# Patient Record
Sex: FEMALE | Race: WHITE | Employment: UNEMPLOYED | URBAN - NONMETROPOLITAN AREA
[De-identification: names, ages, dates, MRNs, and addresses within clinical notes are randomized per-mention and may not be internally consistent; named-entity substitution may affect disease eponyms.]

---

## 2023-03-14 PROBLEM — M54.9 SPINAL COLUMN PAIN: Status: ACTIVE | Noted: 2023-03-14

## 2023-03-14 PROBLEM — T78.40XA ALLERGY: Status: ACTIVE | Noted: 2023-03-14

## 2023-03-14 PROBLEM — A69.20 LYME DISEASE, ACUTE: Status: ACTIVE | Noted: 2023-03-14

## 2023-03-14 PROBLEM — J34.2 NOSE SEPTUM DEVIATION: Status: ACTIVE | Noted: 2023-03-14

## 2023-03-14 PROBLEM — R09.81 CHRONIC NASAL CONGESTION: Status: ACTIVE | Noted: 2023-03-14

## 2023-03-14 PROBLEM — M47.817 SPONDYLOSIS OF LUMBOSACRAL REGION WITHOUT MYELOPATHY OR RADICULOPATHY: Status: ACTIVE | Noted: 2023-03-14

## 2023-03-14 PROBLEM — R09.82 POSTNASAL DISCHARGE: Status: ACTIVE | Noted: 2023-03-14

## 2023-03-14 PROBLEM — T63.481A LOCAL REACTION TO INSECT STING: Status: ACTIVE | Noted: 2023-03-14

## 2023-03-14 PROBLEM — E78.00 HYPERCHOLESTEREMIA: Status: ACTIVE | Noted: 2023-03-14

## 2023-03-14 PROBLEM — R53.83 FATIGUE: Status: ACTIVE | Noted: 2023-03-14

## 2023-11-07 ENCOUNTER — OFFICE VISIT (OUTPATIENT)
Dept: OTOLARYNGOLOGY | Facility: CLINIC | Age: 63
End: 2023-11-07
Payer: COMMERCIAL

## 2023-11-07 DIAGNOSIS — J34.9 NASAL DISORDER: Primary | ICD-10-CM

## 2023-11-07 PROCEDURE — 99213 OFFICE O/P EST LOW 20 MIN: CPT | Performed by: OTOLARYNGOLOGY

## 2023-11-07 NOTE — PROGRESS NOTES
Chief Complaint     follow up        History of Present Illness    11.07.2023: Feels dryness in her nose. She still feels some tightness in her nose.  Lateral parts of her nose tend to collapse with deep inspiration.  She does not want to go for Latera implant right now.  On examination nasal passages look wide open bilaterally.    Recommendations  1- saline and/or oil spray for the nose  2-follow-up in 6 months    _________________________________________________________________    05.09.2023: She comes for follow up. She is using a removable nasal implant to hold her nostrils laterally (Silent mammoth), it helps. She thinks her nostrils collapse if she does not use it.      Secondly, off and on she wakes up, choking her secretions. It may happen during the day, too.  Sleeps better at night, huge difference.      Her nostrils tend to collapse inside with forced inspiration.  Nasal passages look wide open bilaterally.     Recommendation:  1- lateral nasal implant  ____________________________________________________________________________________________     03.07.2023: She comes for follow up. She had nasal surgery (septoplasty, clarifix and RF) on 12.08.2022. Still breathes through her mouth when doing things. She thinks she cannot get enough air through her nose. Feels like her nostrils are collapsing, tries to keep them open. She pulls her cheeks laterally to get more air. She quit rinsing her nose 4 days ago. Felt saline was drying her nose. Still swallowing thick mucus, but it is getting better.      She has less headaches.  Sleep quality is better.      On examination, nasal passages look wide open bilaterally. There was mild crusting, cleaning was done. Mucus (+) at nasopharynx, no purulence. Vocal cords are mobile bilaterally. No mass or ulceration.      Recommendation:  1- try breathe right strips, if they help then we can consider lateral nasal implants.       ____________________________________________________________________________________________        02.10.2023: She comes for follow up. There was crusting scabbing over inferior turbinates bilaterally. Cleaning was done. Nasal passages look open. Feels like there is something at the back side of her upper throat (nasopharynx). I think it may be thick mucus.     Recommendation  Follow-up in 1 month  Continue nasal rinse and ointment     ____________________________________________________________________________________________     01.20.20223: She comes for follow up. On examination there was crusting bilaterally.      Recommendation  1â€“follow-up in 1 month, continue nasal irrigation and ointment.  ____________________________________________________________________________________________        12.13.2022: She had nasal surgery (septoplasty, clarifix and RF) on 12.08.2022.. She comes for follow up. On examination there was crusting bilaterally.     Recommendation  1â€“follow-up in 4 weeks     ____________________________________________________________________________________________     09.28.2022: She comes for follow up. She had her CT scan at an outside imaging center, which shows deviated nasal septum to right. She has difficulty with breathing, thick postnasal discharge. She could not find relief with nasal spray.  History of allergies to multiple antibiotics, NSAIDS, antihistamine tablets and to decongestant tablets. Please check allergy list.     Plan:  1- septoplasty, radiofrequency turbinate reduction, clarifix cryotherapy (she has thick postnasal mucoid discharge)   Surgical plan was explained to her. Questions were answered. She agrees with the plan.     ____________________________________________________________________________________________     Ms. Garcia is a 60 yo F. She feels like she cannot get enough air.   She almost always breathes through her mouth, for years. Blowing her nose does not  get rid of the swelling in her nose. She has used various antibiotics so far, but could not find relief.   Thick postnasal discharge (+). When lying down on her right side, she feels like a glob is moving toward her left side. It is vice versa for the right.      She feels fullness heaviness in her upper palate and throat.  She irrigates her nose with saline/soda solution once or twice a day off and on, for years.      There are spots in her tonsils.   She had covid couple of months ago.     She has tried a nasal spray (fluticasone?) without a benefit.     On examination, nasal septum deviated to right superiorly. RIght inferior turbinate looks moderately congested. Thick clear postnasal discharge. No visible polyps bilaterally. Larynx looks normal.  Tenderness at lateral parts of hyoid bone bilaterally.     Dx:  1- septum deviation  2- chronic nasal congestion  3- postnasal discharge     Plan:  1- CT sinus  2- follow up after the CT scan  3- consider RF turbinate reduction and clarifix cryotherapy      Review of Systems  Below is a copy of her initial ROS, she does not have fever today.      Constitutional: feeling tired,~recent ~Ulb weight gain~and~feeling poorly.   ENMT: pain in the ear,~tinnitus,~the ears feel full,~sinus pressure,~nasal blockage/obstruction,~snoring,~postnasal drip,~sore throat,~dry mouth~and~mouth breathing was reported.   Respiratory: shortness of breath~and~dyspnea during exertion.   Gastrointestinal: pain on swallowing.   Musculoskeletal: arthralgias~and~joint stiffness.   Neurological: headache~and~migraines.   Hematologic/Lymphatic: swollen glands.   All other systems have been reviewed and are negative for complaint.      Medical History     · Allergy (995.3) (T78.40XA)   · Chronic nasal congestion (478.19) (R09.81)   · Fatigue, unspecified type (780.79) (R53.83)   · Hypercholesteremia (272.0) (E78.00)   · Local reaction to insect sting (989.5,E905.9) (T63.481A)   · Lyme disease, acute  (088.81) (A69.20)   · Postnasal discharge (473.9) (R09.82)   · Postoperative examination (V67.00) (Z09)   · Spinal column pain (724.5) (M54.9)   · Spondylosis of lumbosacral region without myelopathy or radiculopathy (721.3)  (M47.817)    · History of deviated nasal septum (V12.69) (Z87.09)   · Resolved Date: 07 Mar 2023   · History of Pap test, as part of routine gynecological examination (V76.2) (Z01.419)     Surgical History     · History of Myringotomy   · History of Oral Surgery Tooth Extraction   · molar 3rd root   · History of Thyroid Surgery Thyroid Lobectomy Right Lobe     Family History     · Family history of hypertension (V17.49) (Z82.49)   · Family history of valvular heart disease (V17.49) (Z82.49)     Social History     · Daily caffeine consumption, 1 serving a day   · Never a smoker   · No alcohol use   · No illicit drug use     Allergies     · ampicillin   Recorded By: Cornelia Maria; 6/28/2021 8:44:55 AM   · Antihistamine TABS   Recorded By: Parker Kramer; 4/14/2014 8:32:35 AM   · aspirin   Recorded By: Beth Mckeon; 8/2/2022 10:40:36 AM   · Bactrim   Recorded By: Cornelia Maria; 6/28/2021 8:44:55 AM   · Decongestant TABS   Recorded By: Parker Kramer; 4/14/2014 8:32:35 AM   · epinephrine   Recorded By: Cornelia Maria; 6/28/2021 8:44:55 AM   · erythromycin   Recorded By: Cornelia Maria; 6/28/2021 8:44:55 AM   · Lactose   Recorded By: Parker Kramer; 4/14/2014 8:32:35 AM   · NSAIDs   Recorded By: Parker Kramer; 4/14/2014 8:32:35 AM   · Penicillins   Recorded By: Parker Kramer; 4/14/2014 8:32:35 AM   · Suprax   Recorded By: Cornelia Maria; 6/28/2021 8:44:55 AM     · Other   Recorded By: Parker Kramer; 4/14/2014 8:32:35 AM  most antibiotics except Cephalosporins      Pain Meds  Denied    · clindamycin   Updated By: Beth Mckeon; 8/2/2022 10:40:36 AM     Current Meds     Medication Name Instruction   Multivitamins TABS        Physical Exam  (old exam note)  General appearance: Healthy-appearing,  well-nourished, well groomed, in no acute distress.      Head and Face: Atraumatic with no masses, lesions, or scarring.      Salivary glands: No tenderness of the parotid glands or parotid masses. 08.02.2022     No tenderness of the submandibular glands or submandibular masses. 08.02.2022     Facial strength: Normal strength and symmetry, no synkinesis or facial tic.      Eyes: Conjunctivas look non-hyperemic bilaterally     Ears: Bilaterally ear canals normal. Tympanic membranes intact, no hyperemia, fluid or retraction.08.02.2022     Oral Cavity/Mouth: Lips and tongue look normal. 08.02.2022     Throat: No postnasal discharge. No tonsil hypertrophy. No hyperemia. 08.02.2022     Neck: Symmetrical, trachea midline. 08.02.2022     Pulmonary: Normal respiratory effort.      Lymphatic: No palpable pathologic lymph nodes at neck. 08.02.2022     Neurological/Psychiatric: Orientation to person, place, and time: Normal.   Mood and affect: Normal.      Extremities: No clubbing.        Procedure  NASAL ENDOSCOPY 05.09.2023:  0 degree nasal endoscope was advanced through patient's nasal cavities. On examination, nasal passages look wide open bilaterally. There was mild crusting, cleaning was done.  ____________________________________________________________________________________________     NASAL ENDOSCOPY 03.07.2023:  0 degree nasal endoscope was advanced through patient's nasal cavities. On examination, nasal passages look wide open bilaterally. There was mild crusting, cleaning was done. Mucus (+) at nasopharynx, no purulence. Vocal cords are mobile bilaterally. No mass or ulceration.      FLEXIBLE LARYNGOSCOPY: 03.07.2023:  Flexible endoscope was advanced through patient's nasal cavity. Base of tongue looked normal. There was no hyperemia, mass, ulceration or purulent secretions at hypopharynx or larynx. Vocal cords and arytenoids were mobile bilaterally. No granulation, polyp, nodule or mass was seen.    ____________________________________________________________________________________________     NASAL ENDOSCOPY: 08.02.2022  0 degree nasal endoscope was advanced through patient's nasal cavities. On examination patient's septum was deviated to right superiorly. Right inferior turbinate was moderately congested. No purulent secretions were observed. No visible polyps on both sides.      FLEXIBLE LARYNGOSCOPY: 08.02.2022  Flexible endoscope was advanced through patient's oral cavity. Base of tongue looked normal. There was no hyperemia, mass, ulceration or purulent secretions at hypopharynx or larynx. Vocal cords and arytenoids were mobile bilaterally. No granulation, polyp, nodule or mass was seen. Thick clear postnasal discharge (+)       Diagnoses/Problems     · Chronic nasal congestion (478.19) (R09.81)     Patient Discussion/Summary     11.07.2023: Feels dryness in her nose. She still feels some tightness in her nose.  Lateral parts of her nose tend to collapse with deep inspiration.  She does not want to go for Latera implant right now.  On examination nasal passages look wide open bilaterally.    Recommendations  1- saline and/or oil spray for the nose  2-follow-up in 6 months    _________________________________________________________________    05.09.2023: She comes for follow up. She is using a removable nasal implant to hold her nostrils laterally (Silent mammoth), it helps. She thinks her nostrils collapse if she does not use it.      Secondly, off and on she wakes up, choking her secretions. It may happen during the day, too.  Sleeps better at night, huge difference.      Her nostrils tend to collapse inside with forced inspiration.  Nasal passages look wide open bilaterally.     Recommendation:  1- lateral nasal implant  ____________________________________________________________________________________________     03.07.2023: She comes for follow up. She had nasal surgery (septoplasty, clarifix  and RF) on 12.08.2022. Still breathes through her mouth when doing things. She thinks she cannot get enough air through her nose. Feels like her nostrils are collapsing, tries to keep them open. She pulls her cheeks laterally to get more air. She quit rinsing her nose 4 days ago. Felt saline was drying her nose. Still swallowing thick mucus, but it is getting better.      She has less headaches.  Sleep quality is better.      On examination, nasal passages look wide open bilaterally. There was mild crusting, cleaning was done. Mucus (+) at nasopharynx, no purulence. Vocal cords are mobile bilaterally. No mass or ulceration.      Recommendation:  1- try breathe right strips, if they help then we can consider lateral nasal implants.      ____________________________________________________________________________________________     02.10.2023: She comes for follow up. There was crusting scabbing over inferior turbinates bilaterally. Cleaning was done. Nasal passages look open. Feels like there is something at the back side of her upper throat (nasopharynx). I think it may be thick mucus.     Recommendation  Follow-up in 1 month  Continue nasal rinse and ointment     ____________________________________________________________________________________________     01.20.20223: She comes for follow up. On examination there was crusting bilaterally.      Recommendation  1â€“follow-up in 1 month, continue nasal irrigation and ointment.  ____________________________________________________________________________________________     12.13.2022: She had nasal surgery (septoplasty, clarifix and RF) on 12.08.2022.. She comes for follow up. On examination there was crusting bilaterally.     Recommendation  1â€“follow-up in 4 weeks     ____________________________________________________________________________________________     09.28.2022: She comes for follow up. She had her CT scan at an outside imaging center, which shows  deviated nasal septum to right. She has difficulty with breathing, thick postnasal discharge. She could not find relief with nasal spray.  History of allergies to multiple antibiotics, NSAIDS, antihistamine tablets and to decongestant tablets. Please check allergy list.     Plan:  1- septoplasty, radiofrequency turbinate reduction, clarifix cryotherapy (she has thick postnasal mucoid discharge)   Surgical plan was explained to her. Questions were answered. She agrees with the plan.     ____________________________________________________________________________________________     Ms. Garcia is a 60 yo F. She feels like she cannot get enough air.   She almost always breathes through her mouth, for years. Blowing her nose does not get rid of the swelling in her nose. She has used various antibiotics so far, but could not find relief.   Thick postnasal discharge (+). When lying down on her right side, she feels like a glob is moving toward her left side. It is vice versa for the right.      She feels fullness heaviness in her upper palate and throat.  She irrigates her nose with saline/soda solution once or twice a day off and on, for years.      There are spots in her tonsils.   She had covid couple of months ago.     She has tried a nasal spray (fluticasone?) without a benefit.     On examination, nasal septum deviated to right superiorly. RIght inferior turbinate looks moderately congested. Thick clear postnasal discharge. No visible polyps bilaterally. Larynx looks normal.  Tenderness at lateral parts of hyoid bone bilaterally.     Dx:  1- septum deviation  2- chronic nasal congestion  3- postnasal discharge     Plan:  1- CT sinus  2- follow up after the CT scan  3- consider RF turbinate reduction and clarifix cryotherapy

## 2024-05-07 ENCOUNTER — APPOINTMENT (OUTPATIENT)
Dept: OTOLARYNGOLOGY | Facility: CLINIC | Age: 64
End: 2024-05-07
Payer: COMMERCIAL

## 2024-06-11 ENCOUNTER — TELEPHONE (OUTPATIENT)
Dept: PRIMARY CARE | Facility: CLINIC | Age: 64
End: 2024-06-11
Payer: COMMERCIAL

## 2024-06-11 NOTE — TELEPHONE ENCOUNTER
Lynette is requesting a excuse for Jury Duty (7/12/24) She hasn't been seen since 6/28/21.  Please advise

## 2024-06-12 NOTE — TELEPHONE ENCOUNTER
Spoke with Lynette, she is aware that she will need to be seen before 6/28/24 for a wellness exam

## 2024-06-17 ENCOUNTER — APPOINTMENT (OUTPATIENT)
Dept: PRIMARY CARE | Facility: CLINIC | Age: 64
End: 2024-06-17
Payer: COMMERCIAL

## 2024-06-17 VITALS
OXYGEN SATURATION: 100 % | HEART RATE: 93 BPM | TEMPERATURE: 97.3 F | SYSTOLIC BLOOD PRESSURE: 148 MMHG | BODY MASS INDEX: 29.23 KG/M2 | WEIGHT: 165 LBS | DIASTOLIC BLOOD PRESSURE: 100 MMHG | HEIGHT: 63 IN

## 2024-06-17 DIAGNOSIS — M47.817 SPONDYLOSIS OF LUMBOSACRAL REGION WITHOUT MYELOPATHY OR RADICULOPATHY: ICD-10-CM

## 2024-06-17 DIAGNOSIS — M54.9 SPINAL COLUMN PAIN: Primary | ICD-10-CM

## 2024-06-17 PROCEDURE — 99213 OFFICE O/P EST LOW 20 MIN: CPT | Performed by: FAMILY MEDICINE

## 2024-06-17 PROCEDURE — 1036F TOBACCO NON-USER: CPT | Performed by: FAMILY MEDICINE

## 2024-06-17 RX ORDER — SENNOSIDES 8.6 MG/1
1 TABLET ORAL DAILY
COMMUNITY

## 2024-06-17 NOTE — PROGRESS NOTES
"Subjective   Patient ID: Lynette Garcia is a 63 y.o. female who presents for Establish Care (NOT SEEN FOR 3 YEARS-UPDATE ON MEDICALHISTORY./NEEDS NOTE FOR JURY DUTY).    HPI PATIENT WITH MULTIPLE HEALTH ISSUES, CURRENTLY HER VERTIGO AND ACUTE ON HER CHRONIC BACK PAIN   MAKING HER UNABLE TO SERVE JURY DUTY     Review of SystemsSINCE THE  VISIT NO INTERVAL HISTORICAL CHANGES IN ANY OF THE 12 SYSTEMS REVIEWED OTHER THAN  VERTIGO AND BALANCE ISSUES AND ACUTE ON CHRONIC SPINAL PAIN   HER B/P IS ELEVATED TODAY \"BECAUSE OF THE VERTIGO\"    Objective   BP (!) 148/100   Pulse 93   Temp 36.3 °C (97.3 °F)   Ht 1.594 m (5' 2.75\")   Wt 74.8 kg (165 lb)   SpO2 100%   BMI 29.46 kg/m²     Physical ExamSINCE RECENT VISIT NO INTERVAL PHYSICAL CHANGES IN ANY OF THE 12 SYSTEMS REVIEWED OTHER THAN ELEVATED B/P DUE TO HER VERTIGO AND BALANCE ISSUES AND WORSENING ACUTE CHRONIC PAIN     Assessment/Plan   Problem List Items Addressed This Visit             ICD-10-CM    Spinal column pain - Primary M54.9    Spondylosis of lumbosacral region without myelopathy or radiculopathy M47.817          "

## 2024-06-17 NOTE — LETTER
To Whom it May Concern:      Please excuse Lynette Garcia from Jury duty due to some health concerns that may effect her ability to participate.      Thank you,      Annamarie Storey D.O.

## 2024-12-30 ENCOUNTER — APPOINTMENT (OUTPATIENT)
Dept: CARDIOLOGY | Facility: HOSPITAL | Age: 64
End: 2024-12-30
Payer: MEDICARE

## 2024-12-30 ENCOUNTER — TELEPHONE (OUTPATIENT)
Dept: PRIMARY CARE | Facility: CLINIC | Age: 64
End: 2024-12-30

## 2024-12-30 ENCOUNTER — HOSPITAL ENCOUNTER (EMERGENCY)
Facility: HOSPITAL | Age: 64
Discharge: HOME | End: 2024-12-30
Attending: EMERGENCY MEDICINE
Payer: MEDICARE

## 2024-12-30 ENCOUNTER — APPOINTMENT (OUTPATIENT)
Dept: RADIOLOGY | Facility: HOSPITAL | Age: 64
End: 2024-12-30
Payer: MEDICARE

## 2024-12-30 VITALS
TEMPERATURE: 97.1 F | BODY MASS INDEX: 29.36 KG/M2 | RESPIRATION RATE: 16 BRPM | DIASTOLIC BLOOD PRESSURE: 89 MMHG | OXYGEN SATURATION: 99 % | HEIGHT: 64 IN | WEIGHT: 171.96 LBS | SYSTOLIC BLOOD PRESSURE: 157 MMHG | HEART RATE: 93 BPM

## 2024-12-30 DIAGNOSIS — R10.32 LEFT LOWER QUADRANT ABDOMINAL PAIN: Primary | ICD-10-CM

## 2024-12-30 DIAGNOSIS — N83.8 OVARIAN MASS, LEFT: ICD-10-CM

## 2024-12-30 LAB
ALBUMIN SERPL BCP-MCNC: 4.7 G/DL (ref 3.4–5)
ALP SERPL-CCNC: 69 U/L (ref 33–136)
ALT SERPL W P-5'-P-CCNC: 17 U/L (ref 7–45)
ANION GAP SERPL CALC-SCNC: 13 MMOL/L (ref 10–20)
APPEARANCE UR: CLEAR
AST SERPL W P-5'-P-CCNC: 14 U/L (ref 9–39)
ATRIAL RATE: 81 BPM
BASOPHILS # BLD AUTO: 0.03 X10*3/UL (ref 0–0.1)
BASOPHILS NFR BLD AUTO: 0.4 %
BILIRUB SERPL-MCNC: 0.5 MG/DL (ref 0–1.2)
BILIRUB UR STRIP.AUTO-MCNC: NEGATIVE MG/DL
BUN SERPL-MCNC: 12 MG/DL (ref 6–23)
CALCIUM SERPL-MCNC: 10.4 MG/DL (ref 8.6–10.3)
CARDIAC TROPONIN I PNL SERPL HS: <3 NG/L (ref 0–13)
CHLORIDE SERPL-SCNC: 105 MMOL/L (ref 98–107)
CO2 SERPL-SCNC: 25 MMOL/L (ref 21–32)
COLOR UR: YELLOW
CREAT SERPL-MCNC: 0.87 MG/DL (ref 0.5–1.05)
EGFRCR SERPLBLD CKD-EPI 2021: 74 ML/MIN/1.73M*2
EOSINOPHIL # BLD AUTO: 0.04 X10*3/UL (ref 0–0.7)
EOSINOPHIL NFR BLD AUTO: 0.6 %
ERYTHROCYTE [DISTWIDTH] IN BLOOD BY AUTOMATED COUNT: 13.2 % (ref 11.5–14.5)
GLUCOSE SERPL-MCNC: 131 MG/DL (ref 74–99)
GLUCOSE UR STRIP.AUTO-MCNC: NEGATIVE MG/DL
HCT VFR BLD AUTO: 47.3 % (ref 36–46)
HGB BLD-MCNC: 15.5 G/DL (ref 12–16)
HOLD SPECIMEN: NORMAL
IMM GRANULOCYTES # BLD AUTO: 0.05 X10*3/UL (ref 0–0.7)
IMM GRANULOCYTES NFR BLD AUTO: 0.7 % (ref 0–0.9)
KETONES UR STRIP.AUTO-MCNC: NEGATIVE MG/DL
LACTATE SERPL-SCNC: 1.4 MMOL/L (ref 0.4–2)
LACTATE SERPL-SCNC: 2.9 MMOL/L (ref 0.4–2)
LEUKOCYTE ESTERASE UR QL STRIP.AUTO: NEGATIVE
LIPASE SERPL-CCNC: 20 U/L (ref 9–82)
LYMPHOCYTES # BLD AUTO: 0.66 X10*3/UL (ref 1.2–4.8)
LYMPHOCYTES NFR BLD AUTO: 9.6 %
MAGNESIUM SERPL-MCNC: 1.95 MG/DL (ref 1.6–2.4)
MCH RBC QN AUTO: 29.2 PG (ref 26–34)
MCHC RBC AUTO-ENTMCNC: 32.8 G/DL (ref 32–36)
MCV RBC AUTO: 89 FL (ref 80–100)
MONOCYTES # BLD AUTO: 0.34 X10*3/UL (ref 0.1–1)
MONOCYTES NFR BLD AUTO: 4.9 %
NEUTROPHILS # BLD AUTO: 5.77 X10*3/UL (ref 1.2–7.7)
NEUTROPHILS NFR BLD AUTO: 83.8 %
NITRITE UR QL STRIP.AUTO: NEGATIVE
NRBC BLD-RTO: 0 /100 WBCS (ref 0–0)
P AXIS: 74 DEGREES
P OFFSET: 195 MS
P ONSET: 142 MS
PH UR STRIP.AUTO: 7 [PH]
PLATELET # BLD AUTO: 259 X10*3/UL (ref 150–450)
POTASSIUM SERPL-SCNC: 3.8 MMOL/L (ref 3.5–5.3)
PR INTERVAL: 160 MS
PROT SERPL-MCNC: 7.1 G/DL (ref 6.4–8.2)
PROT UR STRIP.AUTO-MCNC: NEGATIVE MG/DL
Q ONSET: 222 MS
QRS COUNT: 13 BEATS
QRS DURATION: 92 MS
QT INTERVAL: 374 MS
QTC CALCULATION(BAZETT): 434 MS
QTC FREDERICIA: 413 MS
R AXIS: 35 DEGREES
RBC # BLD AUTO: 5.31 X10*6/UL (ref 4–5.2)
RBC # UR STRIP.AUTO: NEGATIVE /UL
SODIUM SERPL-SCNC: 139 MMOL/L (ref 136–145)
SP GR UR STRIP.AUTO: 1.02
T AXIS: 57 DEGREES
T OFFSET: 409 MS
UROBILINOGEN UR STRIP.AUTO-MCNC: <2 MG/DL
VENTRICULAR RATE: 81 BPM
WBC # BLD AUTO: 6.9 X10*3/UL (ref 4.4–11.3)

## 2024-12-30 PROCEDURE — 86301 IMMUNOASSAY TUMOR CA 19-9: CPT | Mod: CONLAB | Performed by: EMERGENCY MEDICINE

## 2024-12-30 PROCEDURE — 2550000001 HC RX 255 CONTRASTS: Performed by: EMERGENCY MEDICINE

## 2024-12-30 PROCEDURE — 2500000004 HC RX 250 GENERAL PHARMACY W/ HCPCS (ALT 636 FOR OP/ED): Performed by: EMERGENCY MEDICINE

## 2024-12-30 PROCEDURE — 86304 IMMUNOASSAY TUMOR CA 125: CPT | Mod: CONLAB | Performed by: EMERGENCY MEDICINE

## 2024-12-30 PROCEDURE — 74177 CT ABD & PELVIS W/CONTRAST: CPT

## 2024-12-30 PROCEDURE — 36415 COLL VENOUS BLD VENIPUNCTURE: CPT | Performed by: EMERGENCY MEDICINE

## 2024-12-30 PROCEDURE — 82378 CARCINOEMBRYONIC ANTIGEN: CPT | Mod: CONLAB | Performed by: EMERGENCY MEDICINE

## 2024-12-30 PROCEDURE — 2500000004 HC RX 250 GENERAL PHARMACY W/ HCPCS (ALT 636 FOR OP/ED)

## 2024-12-30 PROCEDURE — 96374 THER/PROPH/DIAG INJ IV PUSH: CPT

## 2024-12-30 PROCEDURE — 96361 HYDRATE IV INFUSION ADD-ON: CPT

## 2024-12-30 PROCEDURE — 96375 TX/PRO/DX INJ NEW DRUG ADDON: CPT

## 2024-12-30 PROCEDURE — 80053 COMPREHEN METABOLIC PANEL: CPT | Performed by: EMERGENCY MEDICINE

## 2024-12-30 PROCEDURE — 99285 EMERGENCY DEPT VISIT HI MDM: CPT | Mod: 25 | Performed by: EMERGENCY MEDICINE

## 2024-12-30 PROCEDURE — 83735 ASSAY OF MAGNESIUM: CPT | Performed by: EMERGENCY MEDICINE

## 2024-12-30 PROCEDURE — 81003 URINALYSIS AUTO W/O SCOPE: CPT | Performed by: EMERGENCY MEDICINE

## 2024-12-30 PROCEDURE — 83605 ASSAY OF LACTIC ACID: CPT | Performed by: EMERGENCY MEDICINE

## 2024-12-30 PROCEDURE — 83690 ASSAY OF LIPASE: CPT | Performed by: EMERGENCY MEDICINE

## 2024-12-30 PROCEDURE — 84484 ASSAY OF TROPONIN QUANT: CPT | Performed by: EMERGENCY MEDICINE

## 2024-12-30 PROCEDURE — 85025 COMPLETE CBC W/AUTO DIFF WBC: CPT | Performed by: EMERGENCY MEDICINE

## 2024-12-30 PROCEDURE — 74177 CT ABD & PELVIS W/CONTRAST: CPT | Mod: FOREIGN READ | Performed by: RADIOLOGY

## 2024-12-30 PROCEDURE — 96376 TX/PRO/DX INJ SAME DRUG ADON: CPT

## 2024-12-30 PROCEDURE — 93005 ELECTROCARDIOGRAM TRACING: CPT

## 2024-12-30 RX ORDER — FENTANYL CITRATE 50 UG/ML
25 INJECTION, SOLUTION INTRAMUSCULAR; INTRAVENOUS ONCE
Status: COMPLETED | OUTPATIENT
Start: 2024-12-30 | End: 2024-12-30

## 2024-12-30 RX ORDER — TRAMADOL HYDROCHLORIDE 50 MG/1
50 TABLET ORAL EVERY 6 HOURS PRN
Qty: 10 TABLET | Refills: 0 | Status: SHIPPED | OUTPATIENT
Start: 2024-12-30 | End: 2025-01-05 | Stop reason: HOSPADM

## 2024-12-30 RX ORDER — FENTANYL CITRATE 50 UG/ML
50 INJECTION, SOLUTION INTRAMUSCULAR; INTRAVENOUS ONCE
Status: COMPLETED | OUTPATIENT
Start: 2024-12-30 | End: 2024-12-30

## 2024-12-30 RX ORDER — ONDANSETRON HYDROCHLORIDE 2 MG/ML
4 INJECTION, SOLUTION INTRAVENOUS ONCE
Status: COMPLETED | OUTPATIENT
Start: 2024-12-30 | End: 2024-12-30

## 2024-12-30 RX ORDER — ONDANSETRON 4 MG/1
4 TABLET, ORALLY DISINTEGRATING ORAL EVERY 8 HOURS PRN
Qty: 30 TABLET | Refills: 0 | Status: SHIPPED | OUTPATIENT
Start: 2024-12-30

## 2024-12-30 RX ORDER — ONDANSETRON HYDROCHLORIDE 2 MG/ML
INJECTION, SOLUTION INTRAVENOUS
Status: COMPLETED
Start: 2024-12-30 | End: 2024-12-30

## 2024-12-30 RX ADMIN — IOHEXOL 75 ML: 350 INJECTION, SOLUTION INTRAVENOUS at 06:17

## 2024-12-30 RX ADMIN — ONDANSETRON 4 MG: 2 INJECTION INTRAMUSCULAR; INTRAVENOUS at 05:11

## 2024-12-30 RX ADMIN — ONDANSETRON HYDROCHLORIDE 4 MG: 2 INJECTION, SOLUTION INTRAVENOUS at 08:06

## 2024-12-30 RX ADMIN — FENTANYL CITRATE 25 MCG: 50 INJECTION INTRAMUSCULAR; INTRAVENOUS at 06:28

## 2024-12-30 RX ADMIN — SODIUM CHLORIDE 500 ML: 9 INJECTION, SOLUTION INTRAVENOUS at 05:11

## 2024-12-30 RX ADMIN — ONDANSETRON 4 MG: 2 INJECTION INTRAMUSCULAR; INTRAVENOUS at 08:06

## 2024-12-30 RX ADMIN — FENTANYL CITRATE 50 MCG: 50 INJECTION INTRAMUSCULAR; INTRAVENOUS at 05:11

## 2024-12-30 RX ADMIN — HYDROMORPHONE HYDROCHLORIDE 0.5 MG: 1 INJECTION, SOLUTION INTRAMUSCULAR; INTRAVENOUS; SUBCUTANEOUS at 07:36

## 2024-12-30 ASSESSMENT — PAIN DESCRIPTION - DESCRIPTORS
DESCRIPTORS: CRAMPING
DESCRIPTORS: CRAMPING;DISCOMFORT

## 2024-12-30 ASSESSMENT — PAIN DESCRIPTION - PAIN TYPE: TYPE: ACUTE PAIN

## 2024-12-30 ASSESSMENT — PAIN SCALES - GENERAL
PAINLEVEL_OUTOF10: 9
PAINLEVEL_OUTOF10: 0 - NO PAIN
PAINLEVEL_OUTOF10: 9
PAINLEVEL_OUTOF10: 0 - NO PAIN
PAINLEVEL_OUTOF10: 8

## 2024-12-30 ASSESSMENT — PAIN DESCRIPTION - ORIENTATION
ORIENTATION: LEFT;LOWER;POSTERIOR;ANTERIOR
ORIENTATION: LEFT;LOWER;POSTERIOR

## 2024-12-30 ASSESSMENT — PAIN DESCRIPTION - LOCATION
LOCATION: ABDOMEN
LOCATION: ABDOMEN

## 2024-12-30 ASSESSMENT — PAIN - FUNCTIONAL ASSESSMENT: PAIN_FUNCTIONAL_ASSESSMENT: 0-10

## 2024-12-30 ASSESSMENT — COLUMBIA-SUICIDE SEVERITY RATING SCALE - C-SSRS
6. HAVE YOU EVER DONE ANYTHING, STARTED TO DO ANYTHING, OR PREPARED TO DO ANYTHING TO END YOUR LIFE?: NO
1. IN THE PAST MONTH, HAVE YOU WISHED YOU WERE DEAD OR WISHED YOU COULD GO TO SLEEP AND NOT WAKE UP?: NO
2. HAVE YOU ACTUALLY HAD ANY THOUGHTS OF KILLING YOURSELF?: NO

## 2024-12-30 NOTE — TELEPHONE ENCOUNTER
Lynette states she is in the hospital currently.  They found a mass in her pelvis/ovary.  They are wanting to transfer her to [place] and she wants to know if you have any recommendations on surgeons.  She says please call her .

## 2024-12-30 NOTE — ED PROVIDER NOTES
HPI   Chief Complaint   Patient presents with    Abdominal Pain     2-3 hour abd pain left lower, flank pain.         Patient signed out to me at 0800 hrs. by Dr. Adams.  Patient began with abdominal pain for the past day or 2.  Patient was found to have a left ovarian mass measuring 10 cm x 10 cm.  I did speak with Dr. Wilson regarding a follow-up as an outpatient as a surgical consult.  Tumor markers have been ordered at this time.      Medical Decision Making:    Differential Diagnosis: Colitis, intra-abdominal pathology, electrolyte imbalance    Clinical Laboratory Review: Reviewed all labs at this time    Imaging Review: Imaging reviewed shows a left ovarian mass    Discussion with Consulting Physician: Book with      Treatment Plan: Discharge to home and follow-up with gynecology at this time.  Patient informed that the doctor's office will phone her for a follow-up appointment    Follow Up:  Follow up with your primary care physician as soon as possible    Return Precautions:  Return to the emergency department if symptoms worsen at any time              Patient History   Past Medical History:   Diagnosis Date    Encounter for gynecological examination (general) (routine) without abnormal findings     Pap test, as part of routine gynecological examination     Past Surgical History:   Procedure Laterality Date    MOUTH SURGERY  04/14/2014    Oral Surgery Tooth Extraction    NASAL SEPTUM SURGERY      NOSE SURGERY      NASAL TURBINATES    OTHER SURGICAL HISTORY  04/14/2014    Myringotomy    OTHER SURGICAL HISTORY  04/14/2014    Thyroid Surgery Thyroid Lobectomy Right Lobe     Family History   Problem Relation Name Age of Onset    No Known Problems Mother      Hypertension Father      Valvular heart disease Father       Social History     Tobacco Use    Smoking status: Never    Smokeless tobacco: Never   Vaping Use    Vaping status: Never Used   Substance Use Topics    Alcohol use: Not Currently    Drug  use: Never       Physical Exam   ED Triage Vitals [12/30/24 0445]   Temperature Heart Rate Respirations BP   36 °C (96.8 °F) 79 (!) 21 (!) 166/101      SpO2 Temp Source Heart Rate Source Patient Position   100 % Tympanic -- --      BP Location FiO2 (%)     -- --       Physical Exam      ED Course & [facility]   ED Course as of 12/30/24 1556   Mon Dec 30, 2024   0608 Patient states she has no allergies to iodine and has never had IV contrast before and is concerned about IV contrast.  I had a long discussion with the patient and the  stating that if she is uncomfortable about receiving IV contrast dye will be ordering the CT of the abdomen without IV contrast.  The patient asked me if the IV contrast would help make the diagnosis more defined and I did tell her that the radiologist oftentimes prefers the IV contrast for situations other than kidney stones in order to have more detailed look at the organs and have a better chance of defining the pathology.  She therefore stated she wanted the IV contrast [AG]   0611 CBC and Auto Differential(!)  White blood count 6900 hemoglobin 15.5 [AG]   0611 Lactate 2.9 slightly elevated [AG]   0611 Magnesium 1.95 [AG]   0611 Comprehensive metabolic panel(!)  CMP calcium is 10.4 glucose 131 otherwise negative abnormalities [AG]   0612 Lipase  Lipase 20 normal [AG]   0612 Troponin I, High Sensitivity  Troponin 3 normal [AG]   0614 EKG interpreted by me: Normal sinus rhythm, rate 81, normal ST segments.  Axis is normal.  No STEMI. [AG]   0651 Urinalysis with Reflex Culture and Microscopic  Urine negative [AG]   0709 Urinalysis negative [AG]   0709 Miguelangel lactate level 1.4 which is improved [AG]   0726 I rechecked the patient at 726 and she states anytime she moves she has severe pain and I will therefore give her half a milligram of Dilaudid.  We are still waiting for the results of the CT scan of the abdomen and pelvis [AG]   0743 Signed over to Dr Rogers [AG]   1350 CT abdomen  pelvis w IV contrast [AG]      ED Course User Index  [AG] Bharath Adams MD         Diagnoses as of 12/30/24 1556   Left lower quadrant abdominal pain   Ovarian mass, left                 No data recorded     David Coma Scale Score: 15 (12/30/24 0518 : Deandre Daly, ROBERT)                           Medical Decision Making      Procedure  Procedures      ED Course as of 12/30/24 1556   Mon Dec 30, 2024   0608 Patient states she has no allergies to iodine and has never had IV contrast before and is concerned about IV contrast.  I had a long discussion with the patient and the  stating that if she is uncomfortable about receiving IV contrast dye will be ordering the CT of the abdomen without IV contrast.  The patient asked me if the IV contrast would help make the diagnosis more defined and I did tell her that the radiologist oftentimes prefers the IV contrast for situations other than kidney stones in order to have more detailed look at the organs and have a better chance of defining the pathology.  She therefore stated she wanted the IV contrast [AG]   0611 CBC and Auto Differential(!)  White blood count 6900 hemoglobin 15.5 [AG]   0611 Lactate 2.9 slightly elevated [AG]   0611 Magnesium 1.95 [AG]   0611 Comprehensive metabolic panel(!)  CMP calcium is 10.4 glucose 131 otherwise negative abnormalities [AG]   0612 Lipase  Lipase 20 normal [AG]   0612 Troponin I, High Sensitivity  Troponin 3 normal [AG]   0614 EKG interpreted by me: Normal sinus rhythm, rate 81, normal ST segments.  Axis is normal.  No STEMI. [AG]   0651 Urinalysis with Reflex Culture and Microscopic  Urine negative [AG]   0709 Urinalysis negative [AG]   0709 Miguelangel lactate level 1.4 which is improved [AG]   0726 I rechecked the patient at 726 and she states anytime she moves she has severe pain and I will therefore give her half a milligram of Dilaudid.  We are still waiting for the results of the CT scan of the abdomen and pelvis [AG]   0743  Signed over to Dr Rogers [AG]   1350 CT abdomen pelvis w IV contrast [AG]      ED Course User Index  [AG] Bharath Adams MD         Diagnoses as of 12/30/24 1556   Left lower quadrant abdominal pain   Ovarian mass, left     ED Course as of 12/30/24 1556   Mon Dec 30, 2024   0608 Patient states she has no allergies to iodine and has never had IV contrast before and is concerned about IV contrast.  I had a long discussion with the patient and the  stating that if she is uncomfortable about receiving IV contrast dye will be ordering the CT of the abdomen without IV contrast.  The patient asked me if the IV contrast would help make the diagnosis more defined and I did tell her that the radiologist oftentimes prefers the IV contrast for situations other than kidney stones in order to have more detailed look at the organs and have a better chance of defining the pathology.  She therefore stated she wanted the IV contrast [AG]   0611 CBC and Auto Differential(!)  White blood count 6900 hemoglobin 15.5 [AG]   0611 Lactate 2.9 slightly elevated [AG]   0611 Magnesium 1.95 [AG]   0611 Comprehensive metabolic panel(!)  CMP calcium is 10.4 glucose 131 otherwise negative abnormalities [AG]   0612 Lipase  Lipase 20 normal [AG]   0612 Troponin I, High Sensitivity  Troponin 3 normal [AG]   0614 EKG interpreted by me: Normal sinus rhythm, rate 81, normal ST segments.  Axis is normal.  No STEMI. [AG]   0651 Urinalysis with Reflex Culture and Microscopic  Urine negative [AG]   0709 Urinalysis negative [AG]   0709 Miguelangel lactate level 1.4 which is improved [AG]   0726 I rechecked the patient at 726 and she states anytime she moves she has severe pain and I will therefore give her half a milligram of Dilaudid.  We are still waiting for the results of the CT scan of the abdomen and pelvis [AG]   0743 Signed over to Dr Rogers [AG]   1350 CT abdomen pelvis w IV contrast [AG]      ED Course User Index  [AG] Bharath Adams MD          Diagnoses as of 12/30/24 1556   Left lower quadrant abdominal pain   Ovarian mass, left          Lynette SALAZAR Sue, DO  12/30/24 1131    ED Course as of 12/30/24 1556   Mon Dec 30, 2024   0608 Patient states she has no allergies to iodine and has never had IV contrast before and is concerned about IV contrast.  I had a long discussion with the patient and the  stating that if she is uncomfortable about receiving IV contrast dye will be ordering the CT of the abdomen without IV contrast.  The patient asked me if the IV contrast would help make the diagnosis more defined and I did tell her that the radiologist oftentimes prefers the IV contrast for situations other than kidney stones in order to have more detailed look at the organs and have a better chance of defining the pathology.  She therefore stated she wanted the IV contrast [AG]   0611 CBC and Auto Differential(!)  White blood count 6900 hemoglobin 15.5 [AG]   0611 Lactate 2.9 slightly elevated [AG]   0611 Magnesium 1.95 [AG]   0611 Comprehensive metabolic panel(!)  CMP calcium is 10.4 glucose 131 otherwise negative abnormalities [AG]   0612 Lipase  Lipase 20 normal [AG]   0612 Troponin I, High Sensitivity  Troponin 3 normal [AG]   0614 EKG interpreted by me: Normal sinus rhythm, rate 81, normal ST segments.  Axis is normal.  No STEMI. [AG]   0651 Urinalysis with Reflex Culture and Microscopic  Urine negative [AG]   0709 Urinalysis negative [AG]   0709 Miguelangel lactate level 1.4 which is improved [AG]   0726 I rechecked the patient at 726 and she states anytime she moves she has severe pain and I will therefore give her half a milligram of Dilaudid.  We are still waiting for the results of the CT scan of the abdomen and pelvis [AG]   0743 Signed over to Dr Rogers [AG]   1350 CT abdomen pelvis w IV contrast [AG]      ED Course User Index  [AG] Bharath Adams MD         Diagnoses as of 12/30/24 1556   Left lower quadrant abdominal pain   Ovarian  mass, left            Lynette Rogers,   12/30/24 7988

## 2024-12-30 NOTE — ED PROVIDER NOTES
HPI   Chief Complaint   Patient presents with   • Abdominal Pain     2-3 hour abd pain left lower, flank pain.         Chief complaint lower abdominal pain  History of present illness the patient is a poor historian she is moaning and complaining of relatively sudden onset of lower abdominal pain especially in the suprapubic and left lower quadrants some radiation to the left flank.  Patient had dry heaves several times.  The patient denies any urinary tract symptoms.  She had a very small bowel movement an hour ago no blood per rectum.  No chest pain no shortness of breath patient denies any history of known abdominal obstruction no history of diverticulitis or kidney stone no history of abdominal aortic aneurysm.  The pain is a 10 on a scale of 1-10.  She took nothing for pain prior to arrival.  No fever no chills.    No history of diabetes or heart disease no history of kidney stone   physical exam:    General: Vitals noted, pain  distress, moaning. Afebrile. Alert and oriented  x 4 .  Pupils equal and reactive bilaterally    EENT: TMs clear. Posterior oropharynx unremarkable. No meningismus. No LAD.     Cardiac: Regular, rate, rhythm, no murmurs rubs or gallops.     Pulmonary: Lungs clear bilaterally with good aeration. No adventitious breath sounds. No wheezes rales or rhonchi.     Abdomen: Soft, nonsurgical.  Tenderness noted to the right lower quadrant, suprapubic area, left lower quadrant and mid abdomen with guarding to the left lower quadrant.  No bruising noted to the abdomen.. No peritoneal signs. Normoactive bowel sounds. No pulsatile masses.  No CVA tenderness.    Extremities: No peripheral edema. Negative Homans bilaterally, no cords.    Skin: No rash. Intact.     Neuro: No focal neurologic deficits, NIH score of 0. Cranial nerves normal as tested from II through XII.                   Patient History   Past Medical History:   Diagnosis Date   • Encounter for gynecological examination (general)  (routine) without abnormal findings     Pap test, as part of routine gynecological examination     Past Surgical History:   Procedure Laterality Date   • MOUTH SURGERY  04/14/2014    Oral Surgery Tooth Extraction   • NASAL SEPTUM SURGERY     • NOSE SURGERY      NASAL TURBINATES   • OTHER SURGICAL HISTORY  04/14/2014    Myringotomy   • OTHER SURGICAL HISTORY  04/14/2014    Thyroid Surgery Thyroid Lobectomy Right Lobe     Family History   Problem Relation Name Age of Onset   • No Known Problems Mother     • Hypertension Father     • Valvular heart disease Father       Social History     Tobacco Use   • Smoking status: Never   • Smokeless tobacco: Never   Vaping Use   • Vaping status: Never Used   Substance Use Topics   • Alcohol use: Not Currently   • Drug use: Never       Physical Exam   ED Triage Vitals   Temp Pulse Resp BP   -- -- -- --      SpO2 Temp src Heart Rate Source Patient Position   -- -- -- --      BP Location FiO2 (%)     -- --       Physical Exam      ED Course & [facility]   ED Course as of 12/30/24 1351   Mon Dec 30, 2024   0608 Patient states she has no allergies to iodine and has never had IV contrast before and is concerned about IV contrast.  I had a long discussion with the patient and the  stating that if she is uncomfortable about receiving IV contrast dye will be ordering the CT of the abdomen without IV contrast.  The patient asked me if the IV contrast would help make the diagnosis more defined and I did tell her that the radiologist oftentimes prefers the IV contrast for situations other than kidney stones in order to have more detailed look at the organs and have a better chance of defining the pathology.  She therefore stated she wanted the IV contrast [AG]   0611 CBC and Auto Differential(!)  White blood count 6900 hemoglobin 15.5 [AG]   0611 Lactate 2.9 slightly elevated [AG]   0611 Magnesium 1.95 [AG]   0611 Comprehensive metabolic panel(!)  CMP calcium is 10.4 glucose 131 otherwise  negative abnormalities [AG]   0612 Lipase  Lipase 20 normal [AG]   0612 Troponin I, High Sensitivity  Troponin 3 normal [AG]   0614 EKG interpreted by me: Normal sinus rhythm, rate 81, normal ST segments.  Axis is normal.  No STEMI. [AG]   0651 Urinalysis with Reflex Culture and Microscopic  Urine negative [AG]   0709 Urinalysis negative [AG]   0709 Miguelangel lactate level 1.4 which is improved [AG]   0726 I rechecked the patient at 726 and she states anytime she moves she has severe pain and I will therefore give her half a milligram of Dilaudid.  We are still waiting for the results of the CT scan of the abdomen and pelvis [AG]   0743 Signed over to Dr Rogers [AG]   1350 CT abdomen pelvis w IV contrast [AG]      ED Course User Index  [AG] Bharath Adams MD         Diagnoses as of 12/30/24 1351   Left lower quadrant abdominal pain   Ovarian mass, left                 No data recorded     David Coma Scale Score: 15 (12/30/24 0518 : Deandre Daly RN)                           Medical Decision Making  I will consider bowel perforation, bowel obstruction, abdominal aortic aneurysm, diverticulitis, ureterolithiasis, and other serious causes of intra-abdominal pathology with abdominal pain.  Through the use of history physical examination laboratory testing and imaging I will attempt to delineate the diagnosis.    Procedure  Procedures     Bharath Adams MD  12/30/24 0506       Bharath Adams MD  12/30/24 1349       Bharath Adams MD  12/30/24 1351

## 2024-12-31 LAB
CANCER AG125 SERPL-ACNC: 25.1 U/ML (ref 0–30.2)
CANCER AG19-9 SERPL-ACNC: 8.18 U/ML
CEA SERPL-MCNC: 1.5 UG/L

## 2025-01-01 ENCOUNTER — TELEPHONE (OUTPATIENT)
Dept: GYNECOLOGIC ONCOLOGY | Facility: HOSPITAL | Age: 65
End: 2025-01-01
Payer: MEDICARE

## 2025-01-01 DIAGNOSIS — R19.00 PELVIC MASS: Primary | ICD-10-CM

## 2025-01-02 ENCOUNTER — APPOINTMENT (OUTPATIENT)
Dept: RADIOLOGY | Facility: HOSPITAL | Age: 65
End: 2025-01-02
Payer: MEDICARE

## 2025-01-02 ENCOUNTER — HOSPITAL ENCOUNTER (EMERGENCY)
Facility: HOSPITAL | Age: 65
Discharge: OTHER INSTITUTION | End: 2025-01-03
Attending: STUDENT IN AN ORGANIZED HEALTH CARE EDUCATION/TRAINING PROGRAM
Payer: MEDICARE

## 2025-01-02 ENCOUNTER — TELEPHONE (OUTPATIENT)
Dept: PRIMARY CARE | Facility: CLINIC | Age: 65
End: 2025-01-02
Payer: MEDICARE

## 2025-01-02 ENCOUNTER — PATIENT MESSAGE (OUTPATIENT)
Dept: PRIMARY CARE | Facility: CLINIC | Age: 65
End: 2025-01-02
Payer: MEDICARE

## 2025-01-02 DIAGNOSIS — R19.00 PELVIC MASS: Primary | ICD-10-CM

## 2025-01-02 DIAGNOSIS — R11.2 NAUSEA AND VOMITING, UNSPECIFIED VOMITING TYPE: ICD-10-CM

## 2025-01-02 DIAGNOSIS — R10.9 ABDOMINAL PAIN, UNSPECIFIED ABDOMINAL LOCATION: ICD-10-CM

## 2025-01-02 LAB
ALBUMIN SERPL BCP-MCNC: 4.3 G/DL (ref 3.4–5)
ALP SERPL-CCNC: 68 U/L (ref 33–136)
ALT SERPL W P-5'-P-CCNC: 28 U/L (ref 7–45)
ANION GAP SERPL CALC-SCNC: 13 MMOL/L (ref 10–20)
APPEARANCE UR: CLEAR
AST SERPL W P-5'-P-CCNC: 19 U/L (ref 9–39)
BASOPHILS # BLD AUTO: 0.04 X10*3/UL (ref 0–0.1)
BASOPHILS NFR BLD AUTO: 0.3 %
BILIRUB SERPL-MCNC: 1.2 MG/DL (ref 0–1.2)
BILIRUB UR STRIP.AUTO-MCNC: NEGATIVE MG/DL
BUN SERPL-MCNC: 11 MG/DL (ref 6–23)
CALCIUM SERPL-MCNC: 10.2 MG/DL (ref 8.6–10.3)
CHLORIDE SERPL-SCNC: 101 MMOL/L (ref 98–107)
CO2 SERPL-SCNC: 24 MMOL/L (ref 21–32)
COLOR UR: ABNORMAL
CREAT SERPL-MCNC: 0.85 MG/DL (ref 0.5–1.05)
EGFRCR SERPLBLD CKD-EPI 2021: 76 ML/MIN/1.73M*2
EOSINOPHIL # BLD AUTO: 0.05 X10*3/UL (ref 0–0.7)
EOSINOPHIL NFR BLD AUTO: 0.4 %
ERYTHROCYTE [DISTWIDTH] IN BLOOD BY AUTOMATED COUNT: 12.9 % (ref 11.5–14.5)
GLUCOSE SERPL-MCNC: 102 MG/DL (ref 74–99)
GLUCOSE UR STRIP.AUTO-MCNC: NORMAL MG/DL
HCT VFR BLD AUTO: 41 % (ref 36–46)
HGB BLD-MCNC: 13.7 G/DL (ref 12–16)
IMM GRANULOCYTES # BLD AUTO: 0.04 X10*3/UL (ref 0–0.7)
IMM GRANULOCYTES NFR BLD AUTO: 0.3 % (ref 0–0.9)
KETONES UR STRIP.AUTO-MCNC: ABNORMAL MG/DL
LACTATE SERPL-SCNC: 0.8 MMOL/L (ref 0.4–2)
LEUKOCYTE ESTERASE UR QL STRIP.AUTO: ABNORMAL
LIPASE SERPL-CCNC: 11 U/L (ref 9–82)
LYMPHOCYTES # BLD AUTO: 1.1 X10*3/UL (ref 1.2–4.8)
LYMPHOCYTES NFR BLD AUTO: 8.8 %
MAGNESIUM SERPL-MCNC: 2.19 MG/DL (ref 1.6–2.4)
MCH RBC QN AUTO: 29.3 PG (ref 26–34)
MCHC RBC AUTO-ENTMCNC: 33.4 G/DL (ref 32–36)
MCV RBC AUTO: 88 FL (ref 80–100)
MONOCYTES # BLD AUTO: 0.93 X10*3/UL (ref 0.1–1)
MONOCYTES NFR BLD AUTO: 7.5 %
NEUTROPHILS # BLD AUTO: 10.29 X10*3/UL (ref 1.2–7.7)
NEUTROPHILS NFR BLD AUTO: 82.7 %
NITRITE UR QL STRIP.AUTO: NEGATIVE
NRBC BLD-RTO: 0 /100 WBCS (ref 0–0)
PH UR STRIP.AUTO: 7 [PH]
PLATELET # BLD AUTO: 251 X10*3/UL (ref 150–450)
POTASSIUM SERPL-SCNC: 3.6 MMOL/L (ref 3.5–5.3)
PROT SERPL-MCNC: 7.2 G/DL (ref 6.4–8.2)
PROT UR STRIP.AUTO-MCNC: NEGATIVE MG/DL
RBC # BLD AUTO: 4.67 X10*6/UL (ref 4–5.2)
RBC # UR STRIP.AUTO: NEGATIVE /UL
RBC #/AREA URNS AUTO: NORMAL /HPF
SODIUM SERPL-SCNC: 134 MMOL/L (ref 136–145)
SP GR UR STRIP.AUTO: 1
SQUAMOUS #/AREA URNS AUTO: NORMAL /HPF
UROBILINOGEN UR STRIP.AUTO-MCNC: NORMAL MG/DL
WBC # BLD AUTO: 12.5 X10*3/UL (ref 4.4–11.3)
WBC #/AREA URNS AUTO: NORMAL /HPF

## 2025-01-02 PROCEDURE — 2500000004 HC RX 250 GENERAL PHARMACY W/ HCPCS (ALT 636 FOR OP/ED): Performed by: EMERGENCY MEDICINE

## 2025-01-02 PROCEDURE — 85025 COMPLETE CBC W/AUTO DIFF WBC: CPT | Performed by: PHYSICIAN ASSISTANT

## 2025-01-02 PROCEDURE — 74177 CT ABD & PELVIS W/CONTRAST: CPT | Performed by: RADIOLOGY

## 2025-01-02 PROCEDURE — 36415 COLL VENOUS BLD VENIPUNCTURE: CPT | Performed by: PHYSICIAN ASSISTANT

## 2025-01-02 PROCEDURE — 96375 TX/PRO/DX INJ NEW DRUG ADDON: CPT

## 2025-01-02 PROCEDURE — 2550000001 HC RX 255 CONTRASTS: Performed by: PHYSICIAN ASSISTANT

## 2025-01-02 PROCEDURE — 99285 EMERGENCY DEPT VISIT HI MDM: CPT | Mod: 25 | Performed by: STUDENT IN AN ORGANIZED HEALTH CARE EDUCATION/TRAINING PROGRAM

## 2025-01-02 PROCEDURE — 96361 HYDRATE IV INFUSION ADD-ON: CPT

## 2025-01-02 PROCEDURE — 76830 TRANSVAGINAL US NON-OB: CPT | Performed by: RADIOLOGY

## 2025-01-02 PROCEDURE — 2500000004 HC RX 250 GENERAL PHARMACY W/ HCPCS (ALT 636 FOR OP/ED): Performed by: PHYSICIAN ASSISTANT

## 2025-01-02 PROCEDURE — 81001 URINALYSIS AUTO W/SCOPE: CPT | Performed by: PHYSICIAN ASSISTANT

## 2025-01-02 PROCEDURE — 74177 CT ABD & PELVIS W/CONTRAST: CPT

## 2025-01-02 PROCEDURE — 83690 ASSAY OF LIPASE: CPT | Performed by: PHYSICIAN ASSISTANT

## 2025-01-02 PROCEDURE — 83735 ASSAY OF MAGNESIUM: CPT | Performed by: PHYSICIAN ASSISTANT

## 2025-01-02 PROCEDURE — 84075 ASSAY ALKALINE PHOSPHATASE: CPT | Performed by: PHYSICIAN ASSISTANT

## 2025-01-02 PROCEDURE — 83605 ASSAY OF LACTIC ACID: CPT | Performed by: PHYSICIAN ASSISTANT

## 2025-01-02 PROCEDURE — 76857 US EXAM PELVIC LIMITED: CPT | Performed by: RADIOLOGY

## 2025-01-02 PROCEDURE — 76856 US EXAM PELVIC COMPLETE: CPT

## 2025-01-02 PROCEDURE — 96374 THER/PROPH/DIAG INJ IV PUSH: CPT | Mod: 59

## 2025-01-02 PROCEDURE — 87086 URINE CULTURE/COLONY COUNT: CPT | Mod: GEALAB | Performed by: PHYSICIAN ASSISTANT

## 2025-01-02 RX ORDER — ONDANSETRON HYDROCHLORIDE 2 MG/ML
4 INJECTION, SOLUTION INTRAVENOUS ONCE
Status: DISCONTINUED | OUTPATIENT
Start: 2025-01-02 | End: 2025-01-03 | Stop reason: HOSPADM

## 2025-01-02 RX ORDER — SODIUM CHLORIDE 9 MG/ML
125 INJECTION, SOLUTION INTRAVENOUS CONTINUOUS
Status: DISCONTINUED | OUTPATIENT
Start: 2025-01-02 | End: 2025-01-03 | Stop reason: HOSPADM

## 2025-01-02 RX ORDER — ONDANSETRON HYDROCHLORIDE 2 MG/ML
4 INJECTION, SOLUTION INTRAVENOUS ONCE
Status: COMPLETED | OUTPATIENT
Start: 2025-01-02 | End: 2025-01-02

## 2025-01-02 RX ADMIN — ONDANSETRON 4 MG: 2 INJECTION, SOLUTION INTRAMUSCULAR; INTRAVENOUS at 18:13

## 2025-01-02 RX ADMIN — HYDROMORPHONE HYDROCHLORIDE 0.5 MG: 1 INJECTION, SOLUTION INTRAMUSCULAR; INTRAVENOUS; SUBCUTANEOUS at 18:13

## 2025-01-02 RX ADMIN — SODIUM CHLORIDE 1000 ML: 9 INJECTION, SOLUTION INTRAVENOUS at 16:57

## 2025-01-02 RX ADMIN — IOHEXOL 75 ML: 350 INJECTION, SOLUTION INTRAVENOUS at 19:28

## 2025-01-02 RX ADMIN — SODIUM CHLORIDE 125 ML/HR: 9 INJECTION, SOLUTION INTRAVENOUS at 22:30

## 2025-01-02 ASSESSMENT — LIFESTYLE VARIABLES
EVER FELT BAD OR GUILTY ABOUT YOUR DRINKING: NO
EVER HAD A DRINK FIRST THING IN THE MORNING TO STEADY YOUR NERVES TO GET RID OF A HANGOVER: NO
HAVE PEOPLE ANNOYED YOU BY CRITICIZING YOUR DRINKING: NO
TOTAL SCORE: 0
HAVE YOU EVER FELT YOU SHOULD CUT DOWN ON YOUR DRINKING: NO

## 2025-01-02 ASSESSMENT — PAIN - FUNCTIONAL ASSESSMENT: PAIN_FUNCTIONAL_ASSESSMENT: 0-10

## 2025-01-02 ASSESSMENT — PAIN SCALES - GENERAL: PAINLEVEL_OUTOF10: 8

## 2025-01-02 NOTE — TELEPHONE ENCOUNTER
Im sorry it was [facility] not [facility]. She says that the wait was at least one month and a half out and she would like you to recommend who she should see.     She says that she is having difficulty with BM and hasn't eaten any solids for quite some time. She also is fainting when she is getting up in the morning to use the restroom.    Please advise.

## 2025-01-02 NOTE — ED PROVIDER NOTES
HPI   Chief Complaint   Patient presents with    Abdominal Pain     Pt presents to ED from home for lower abdominal pain and constipation due to a left-sided ovarian mass on her ovary.  Pt states she has been unable to have a bowel movement since December 30th and the pain is making her nauseous and weak.         This is a 64-year-old female presenting for evaluation of midline and left lower quadrant sharp and aching abdominal pain with some radiation to the left flank with associated nausea, vomiting, poor p.o. intake.  She had a loose stool today and states she has been barely able to eat anything.  She was seen in the ER at [facility] 12/30 for same and a CT scan showed heterogeneous solid-appearing mass in the central portion of the pelvis measuring 9.6 cm x 10 cm. Findings concerning for a possible ovarian neoplasm.  Suspect a left adnexal mass, although given the location is a exophytic fibroid could have a similar appearance.  She says there was plan to transfer her to [facility] however due to bed unavailability she ended up being discharged home and has been unable to tolerate p.o. and her pain is poorly controlled at home so she came here.      History provided by:  Patient   used: No            Patient History   Past Medical History:   Diagnosis Date    Encounter for gynecological examination (general) (routine) without abnormal findings     Pap test, as part of routine gynecological examination     Past Surgical History:   Procedure Laterality Date    MOUTH SURGERY  04/14/2014    Oral Surgery Tooth Extraction    NASAL SEPTUM SURGERY      NOSE SURGERY      NASAL TURBINATES    OTHER SURGICAL HISTORY  04/14/2014    Myringotomy    OTHER SURGICAL HISTORY  04/14/2014    Thyroid Surgery Thyroid Lobectomy Right Lobe     Family History   Problem Relation Name Age of Onset    No Known Problems Mother      Hypertension Father      Valvular heart disease Father       Social History     Tobacco  Use    Smoking status: Never    Smokeless tobacco: Never   Vaping Use    Vaping status: Never Used   Substance Use Topics    Alcohol use: Not Currently    Drug use: Never       Physical Exam   ED Triage Vitals [01/02/25 1634]   Temperature Heart Rate Respirations BP   36.2 °C (97.2 °F) (!) 105 18 135/72      Pulse Ox Temp Source Heart Rate Source Patient Position   99 % Skin Monitor --      BP Location FiO2 (%)     -- --       Physical Exam    General: Vitals noted. Afebrile.  Appears uncomfortable.  EENT: Sclerae anicteric  Cardiac: Regular rate and rhythm. No murmur  Pulmonary: Lungs clear bilaterally with good aeration  Abdomen: Soft.  Significantly tender to palpation in the midline and left lower quadrant with guarding.   : No CVA tenderness. exam deferred  Extremities: GONZALES normally  Skin: No rash on abdomen  Neuro: Alert and oriented    ED Course & [facility]   ED Course as of 01/02/25 2145   Thu Jan 02, 2025   1831 ED Attending Attestation: 64-year-old female with recently diagnosed large pelvic mass presenting to the emergency department with severe pelvic abdominal pain.  Was supposed to get outpatient pelvic MRI for assessment of the mass and follow-up with OB/GYN, the pain has been too severe and she has had change in her bowel habits.  I reviewed her workup from outside hospital, reviewed her CT imaging that demonstrated the pelvic mass, limited to bowel obstruction.  She is still passing gas as I do not think she has a complete bowel obstruction.  We did an ultrasound that redemonstrates the large mass.  Blood work was completed as well.  She is tachycardic and has a slight leukocytosis, but I suspect this is more secondary to pain in the mass and I do not suspect infection or sepsis at this time.  Will discuss with OB/GYN on how to best manage with patient symptoms.  She may require admission because of this. [SH]      ED Course User Index  [SH] Miguel Ledezma MD         Diagnoses as of 01/02/25 2145    Pelvic mass   Abdominal pain, unspecified abdominal location   Nausea and vomiting, unspecified vomiting type                 No data recorded     David Coma Scale Score: 15 (01/02/25 1634 : Marika Howard RN)                           Medical Decision Making  Patient presents after recent ER visit identifying a pelvic mass.  Unable to tolerate p.o. at home and her pain is not controlled.  She is very uncomfortable appearing and is tender to palpation in the midline left lower quadrant with guarding.  Out of consideration for obstruction, worsening mass, superimposed infection, abscess, torsion blood work and a pelvic ultrasound was obtained.  Blood work shows a leukocytosis of 12.5, neutrophil predominant.  Stable H&H.  Remainder of laboratory evaluation is reassuring with negative lactate.  Ultrasound showed redemonstration of previously diagnosed mass without good visualization of ovarian structures.  To further evaluate for possibly secondary obstruction a CT scan was obtained showing redemonstration of heterogeneous enlarged uterus with a large mass with increase surrounding inflammatory changes with the radiologist noting a differential of torsion of a subserosal leiomyoma versus tightest ovarian mass such as fibroma or fibrothecoma.  I discussed the case with Dr. Cristobal who feels that given that the patient is postmenopausal this is less likely an ovarian torsion and would benefit from evaluation by Gyn onc.  I discussed the case with Dr. Wilson who accepted the patient in transfer.  The patient was given IV Dilaudid 0.5 mg, IV Zofran 4 mg, IV normal saline 1 L.  Is currently stable pending transfer.  This visit was staffed with the attending physician Dr. Ledezma.      Disclaimer: This note was dictated using speech recognition software. An attempt at proofreading was made to minimize errors. Minor errors in transcription may be present. Please call if questions.    Amount and/or Complexity of Data  Reviewed  Labs: ordered.  Radiology: ordered.  ECG/medicine tests: ordered and independent interpretation performed.     Details: EKG interpreted by me: Sinus tachycardia.  Rate 101.  Leftward axis.  QTc 430.  No acute T wave changes.  No STEMI.        Procedure  Procedures     Rodo Dent PA-C  01/02/25 1144

## 2025-01-02 NOTE — TELEPHONE ENCOUNTER
Seen in the ER. Outpatient work up of pelvic mass recommended. STAT pelvic MRI ordered. Office to schedule consult.     Laura Wilson MD MPH

## 2025-01-02 NOTE — TELEPHONE ENCOUNTER
CALLED PATIENT ABOUT HER ABNORMAL CT IN THE ER   THIS ABNORMALITY OF A PELVIC MASS NEEDS FURTHER EVALUATION WITH OUTPATIENT   HER CANCER TUMOR MARKERS ARE NEGATIVE   I LEFT A PHONE MESSAGE TO RETURN MY CALL

## 2025-01-03 ENCOUNTER — HOSPITAL ENCOUNTER (INPATIENT)
Facility: HOSPITAL | Age: 65
LOS: 2 days | Discharge: HOME | End: 2025-01-05
Attending: STUDENT IN AN ORGANIZED HEALTH CARE EDUCATION/TRAINING PROGRAM | Admitting: STUDENT IN AN ORGANIZED HEALTH CARE EDUCATION/TRAINING PROGRAM
Payer: MEDICARE

## 2025-01-03 ENCOUNTER — ANESTHESIA (OUTPATIENT)
Dept: RADIOLOGY | Facility: HOSPITAL | Age: 65
DRG: 392 | End: 2025-01-03
Payer: MEDICARE

## 2025-01-03 ENCOUNTER — APPOINTMENT (OUTPATIENT)
Dept: RADIOLOGY | Facility: HOSPITAL | Age: 65
DRG: 392 | End: 2025-01-03
Payer: MEDICARE

## 2025-01-03 ENCOUNTER — ANESTHESIA EVENT (OUTPATIENT)
Dept: RADIOLOGY | Facility: HOSPITAL | Age: 65
DRG: 392 | End: 2025-01-03
Payer: MEDICARE

## 2025-01-03 VITALS
WEIGHT: 170 LBS | RESPIRATION RATE: 16 BRPM | HEART RATE: 94 BPM | TEMPERATURE: 97.5 F | BODY MASS INDEX: 29.02 KG/M2 | OXYGEN SATURATION: 96 % | DIASTOLIC BLOOD PRESSURE: 70 MMHG | SYSTOLIC BLOOD PRESSURE: 129 MMHG | HEIGHT: 64 IN

## 2025-01-03 DIAGNOSIS — R10.32 LEFT LOWER QUADRANT ABDOMINAL PAIN: ICD-10-CM

## 2025-01-03 DIAGNOSIS — R19.00 PELVIC MASS IN FEMALE: Primary | ICD-10-CM

## 2025-01-03 LAB
ABO GROUP (TYPE) IN BLOOD: NORMAL
ANTIBODY SCREEN: NORMAL
HOLD SPECIMEN: NORMAL
RH FACTOR (ANTIGEN D): NORMAL

## 2025-01-03 PROCEDURE — 72197 MRI PELVIS W/O & W/DYE: CPT

## 2025-01-03 PROCEDURE — 2500000001 HC RX 250 WO HCPCS SELF ADMINISTERED DRUGS (ALT 637 FOR MEDICARE OP)

## 2025-01-03 PROCEDURE — 36415 COLL VENOUS BLD VENIPUNCTURE: CPT

## 2025-01-03 PROCEDURE — 1170000001 HC PRIVATE ONCOLOGY ROOM DAILY

## 2025-01-03 PROCEDURE — 86850 RBC ANTIBODY SCREEN: CPT

## 2025-01-03 PROCEDURE — A9575 INJ GADOTERATE MEGLUMI 0.1ML: HCPCS | Performed by: STUDENT IN AN ORGANIZED HEALTH CARE EDUCATION/TRAINING PROGRAM

## 2025-01-03 PROCEDURE — 86901 BLOOD TYPING SEROLOGIC RH(D): CPT

## 2025-01-03 PROCEDURE — 2500000004 HC RX 250 GENERAL PHARMACY W/ HCPCS (ALT 636 FOR OP/ED): Performed by: EMERGENCY MEDICINE

## 2025-01-03 PROCEDURE — 96376 TX/PRO/DX INJ SAME DRUG ADON: CPT

## 2025-01-03 PROCEDURE — 2550000001 HC RX 255 CONTRASTS: Performed by: STUDENT IN AN ORGANIZED HEALTH CARE EDUCATION/TRAINING PROGRAM

## 2025-01-03 PROCEDURE — 2500000004 HC RX 250 GENERAL PHARMACY W/ HCPCS (ALT 636 FOR OP/ED)

## 2025-01-03 PROCEDURE — 72197 MRI PELVIS W/O & W/DYE: CPT | Performed by: RADIOLOGY

## 2025-01-03 PROCEDURE — 99222 1ST HOSP IP/OBS MODERATE 55: CPT

## 2025-01-03 RX ORDER — ACETAMINOPHEN 500 MG
5 TABLET ORAL NIGHTLY PRN
Status: DISCONTINUED | OUTPATIENT
Start: 2025-01-03 | End: 2025-01-05 | Stop reason: HOSPADM

## 2025-01-03 RX ORDER — FLAXSEED OIL 1000 MG
1000 CAPSULE ORAL DAILY
COMMUNITY

## 2025-01-03 RX ORDER — ENOXAPARIN SODIUM 100 MG/ML
40 INJECTION SUBCUTANEOUS EVERY 24 HOURS
Status: DISCONTINUED | OUTPATIENT
Start: 2025-01-03 | End: 2025-01-05 | Stop reason: HOSPADM

## 2025-01-03 RX ORDER — HYDROMORPHONE HYDROCHLORIDE 1 MG/ML
0.2 INJECTION, SOLUTION INTRAMUSCULAR; INTRAVENOUS; SUBCUTANEOUS ONCE
Status: COMPLETED | OUTPATIENT
Start: 2025-01-03 | End: 2025-01-03

## 2025-01-03 RX ORDER — ONDANSETRON HYDROCHLORIDE 2 MG/ML
4 INJECTION, SOLUTION INTRAVENOUS EVERY 6 HOURS PRN
Status: DISCONTINUED | OUTPATIENT
Start: 2025-01-03 | End: 2025-01-05 | Stop reason: HOSPADM

## 2025-01-03 RX ORDER — ONDANSETRON 4 MG/1
4 TABLET, FILM COATED ORAL EVERY 6 HOURS PRN
Status: DISCONTINUED | OUTPATIENT
Start: 2025-01-03 | End: 2025-01-05 | Stop reason: HOSPADM

## 2025-01-03 RX ORDER — METOCLOPRAMIDE HYDROCHLORIDE 5 MG/ML
10 INJECTION INTRAMUSCULAR; INTRAVENOUS EVERY 6 HOURS PRN
Status: DISCONTINUED | OUTPATIENT
Start: 2025-01-03 | End: 2025-01-04

## 2025-01-03 RX ORDER — LIDOCAINE 560 MG/1
1 PATCH PERCUTANEOUS; TOPICAL; TRANSDERMAL DAILY
Status: DISCONTINUED | OUTPATIENT
Start: 2025-01-03 | End: 2025-01-05 | Stop reason: HOSPADM

## 2025-01-03 RX ORDER — ONDANSETRON HYDROCHLORIDE 2 MG/ML
4 INJECTION, SOLUTION INTRAVENOUS ONCE
Status: COMPLETED | OUTPATIENT
Start: 2025-01-03 | End: 2025-01-03

## 2025-01-03 RX ORDER — TRAMADOL HYDROCHLORIDE 50 MG/1
50 TABLET ORAL EVERY 6 HOURS PRN
Status: DISCONTINUED | OUTPATIENT
Start: 2025-01-03 | End: 2025-01-04

## 2025-01-03 RX ORDER — METOCLOPRAMIDE 10 MG/1
10 TABLET ORAL EVERY 6 HOURS PRN
Status: DISCONTINUED | OUTPATIENT
Start: 2025-01-03 | End: 2025-01-04

## 2025-01-03 RX ORDER — GADOTERATE MEGLUMINE 376.9 MG/ML
15 INJECTION INTRAVENOUS
Status: COMPLETED | OUTPATIENT
Start: 2025-01-03 | End: 2025-01-03

## 2025-01-03 RX ORDER — ACETAMINOPHEN 325 MG/1
650 TABLET ORAL EVERY 4 HOURS PRN
Status: DISCONTINUED | OUTPATIENT
Start: 2025-01-03 | End: 2025-01-05 | Stop reason: HOSPADM

## 2025-01-03 RX ORDER — HYDROMORPHONE HYDROCHLORIDE 1 MG/ML
1 INJECTION, SOLUTION INTRAMUSCULAR; INTRAVENOUS; SUBCUTANEOUS ONCE
Status: COMPLETED | OUTPATIENT
Start: 2025-01-03 | End: 2025-01-03

## 2025-01-03 RX ORDER — IBUPROFEN 400 MG/1
200 TABLET ORAL EVERY 6 HOURS PRN
Status: DISCONTINUED | OUTPATIENT
Start: 2025-01-03 | End: 2025-01-05 | Stop reason: HOSPADM

## 2025-01-03 RX ADMIN — HYDROMORPHONE HYDROCHLORIDE 1 MG: 1 INJECTION, SOLUTION INTRAMUSCULAR; INTRAVENOUS; SUBCUTANEOUS at 02:55

## 2025-01-03 RX ADMIN — HYDROMORPHONE HYDROCHLORIDE 0.2 MG: 1 INJECTION, SOLUTION INTRAMUSCULAR; INTRAVENOUS; SUBCUTANEOUS at 12:39

## 2025-01-03 RX ADMIN — ONDANSETRON 4 MG: 2 INJECTION INTRAMUSCULAR; INTRAVENOUS at 12:38

## 2025-01-03 RX ADMIN — ACETAMINOPHEN 650 MG: 325 TABLET ORAL at 16:43

## 2025-01-03 RX ADMIN — ENOXAPARIN SODIUM 40 MG: 100 INJECTION SUBCUTANEOUS at 07:47

## 2025-01-03 RX ADMIN — ONDANSETRON 4 MG: 2 INJECTION, SOLUTION INTRAMUSCULAR; INTRAVENOUS at 02:54

## 2025-01-03 RX ADMIN — GADOTERATE MEGLUMINE 15 ML: 376.9 INJECTION INTRAVENOUS at 14:38

## 2025-01-03 SDOH — ECONOMIC STABILITY: INCOME INSECURITY: IN THE PAST 12 MONTHS HAS THE ELECTRIC, GAS, OIL, OR WATER COMPANY THREATENED TO SHUT OFF SERVICES IN YOUR HOME?: NO

## 2025-01-03 SDOH — SOCIAL STABILITY: SOCIAL INSECURITY: DO YOU FEEL ANYONE HAS EXPLOITED OR TAKEN ADVANTAGE OF YOU FINANCIALLY OR OF YOUR PERSONAL PROPERTY?: NO

## 2025-01-03 SDOH — SOCIAL STABILITY: SOCIAL INSECURITY: WITHIN THE LAST YEAR, HAVE YOU BEEN HUMILIATED OR EMOTIONALLY ABUSED IN OTHER WAYS BY YOUR PARTNER OR EX-PARTNER?: NO

## 2025-01-03 SDOH — SOCIAL STABILITY: SOCIAL INSECURITY
WITHIN THE LAST YEAR, HAVE YOU BEEN RAPED OR FORCED TO HAVE ANY KIND OF SEXUAL ACTIVITY BY YOUR PARTNER OR EX-PARTNER?: NO

## 2025-01-03 SDOH — SOCIAL STABILITY: SOCIAL INSECURITY: ABUSE: ADULT

## 2025-01-03 SDOH — SOCIAL STABILITY: SOCIAL INSECURITY
WITHIN THE LAST YEAR, HAVE YOU BEEN KICKED, HIT, SLAPPED, OR OTHERWISE PHYSICALLY HURT BY YOUR PARTNER OR EX-PARTNER?: NO

## 2025-01-03 SDOH — SOCIAL STABILITY: SOCIAL INSECURITY: WITHIN THE LAST YEAR, HAVE YOU BEEN AFRAID OF YOUR PARTNER OR EX-PARTNER?: NO

## 2025-01-03 SDOH — ECONOMIC STABILITY: FOOD INSECURITY: WITHIN THE PAST 12 MONTHS, YOU WORRIED THAT YOUR FOOD WOULD RUN OUT BEFORE YOU GOT THE MONEY TO BUY MORE.: NEVER TRUE

## 2025-01-03 SDOH — SOCIAL STABILITY: SOCIAL INSECURITY: HAVE YOU HAD THOUGHTS OF HARMING ANYONE ELSE?: NO

## 2025-01-03 SDOH — SOCIAL STABILITY: SOCIAL INSECURITY: DO YOU FEEL UNSAFE GOING BACK TO THE PLACE WHERE YOU ARE LIVING?: NO

## 2025-01-03 SDOH — ECONOMIC STABILITY: FOOD INSECURITY: WITHIN THE PAST 12 MONTHS, THE FOOD YOU BOUGHT JUST DIDN'T LAST AND YOU DIDN'T HAVE MONEY TO GET MORE.: NEVER TRUE

## 2025-01-03 SDOH — SOCIAL STABILITY: SOCIAL INSECURITY: ARE THERE ANY APPARENT SIGNS OF INJURIES/BEHAVIORS THAT COULD BE RELATED TO ABUSE/NEGLECT?: NO

## 2025-01-03 SDOH — SOCIAL STABILITY: SOCIAL INSECURITY: WERE YOU ABLE TO COMPLETE ALL THE BEHAVIORAL HEALTH SCREENINGS?: YES

## 2025-01-03 SDOH — SOCIAL STABILITY: SOCIAL INSECURITY: HAS ANYONE EVER THREATENED TO HURT YOUR FAMILY OR YOUR PETS?: YES

## 2025-01-03 SDOH — SOCIAL STABILITY: SOCIAL INSECURITY: ARE YOU OR HAVE YOU BEEN THREATENED OR ABUSED PHYSICALLY, EMOTIONALLY, OR SEXUALLY BY ANYONE?: NO

## 2025-01-03 SDOH — SOCIAL STABILITY: SOCIAL INSECURITY: DOES ANYONE TRY TO KEEP YOU FROM HAVING/CONTACTING OTHER FRIENDS OR DOING THINGS OUTSIDE YOUR HOME?: NO

## 2025-01-03 ASSESSMENT — PAIN SCALES - GENERAL
PAINLEVEL_OUTOF10: 7
PAINLEVEL_OUTOF10: 1
PAINLEVEL_OUTOF10: 3
PAINLEVEL_OUTOF10: 3
PAINLEVEL_OUTOF10: 2
PAINLEVEL_OUTOF10: 8

## 2025-01-03 ASSESSMENT — PATIENT HEALTH QUESTIONNAIRE - PHQ9
SUM OF ALL RESPONSES TO PHQ9 QUESTIONS 1 & 2: 0
1. LITTLE INTEREST OR PLEASURE IN DOING THINGS: NOT AT ALL
2. FEELING DOWN, DEPRESSED OR HOPELESS: NOT AT ALL

## 2025-01-03 ASSESSMENT — COGNITIVE AND FUNCTIONAL STATUS - GENERAL
DAILY ACTIVITIY SCORE: 24
DAILY ACTIVITIY SCORE: 24
STANDING UP FROM CHAIR USING ARMS: A LITTLE
PATIENT BASELINE BEDBOUND: NO
MOBILITY SCORE: 24
MOBILITY SCORE: 23

## 2025-01-03 ASSESSMENT — PAIN DESCRIPTION - DESCRIPTORS: DESCRIPTORS: PRESSURE

## 2025-01-03 ASSESSMENT — ACTIVITIES OF DAILY LIVING (ADL)
PATIENT'S MEMORY ADEQUATE TO SAFELY COMPLETE DAILY ACTIVITIES?: YES
BATHING: INDEPENDENT
TOILETING: INDEPENDENT
WALKS IN HOME: INDEPENDENT
GROOMING: INDEPENDENT
JUDGMENT_ADEQUATE_SAFELY_COMPLETE_DAILY_ACTIVITIES: YES
FEEDING YOURSELF: INDEPENDENT
ASSISTIVE_DEVICE: EYEGLASSES
DRESSING YOURSELF: INDEPENDENT
HEARING - LEFT EAR: FUNCTIONAL
HEARING - RIGHT EAR: FUNCTIONAL
ADEQUATE_TO_COMPLETE_ADL: YES
LACK_OF_TRANSPORTATION: NO

## 2025-01-03 ASSESSMENT — COLUMBIA-SUICIDE SEVERITY RATING SCALE - C-SSRS
1. IN THE PAST MONTH, HAVE YOU WISHED YOU WERE DEAD OR WISHED YOU COULD GO TO SLEEP AND NOT WAKE UP?: NO
6. HAVE YOU EVER DONE ANYTHING, STARTED TO DO ANYTHING, OR PREPARED TO DO ANYTHING TO END YOUR LIFE?: NO
2. HAVE YOU ACTUALLY HAD ANY THOUGHTS OF KILLING YOURSELF?: NO

## 2025-01-03 ASSESSMENT — LIFESTYLE VARIABLES
HOW OFTEN DO YOU HAVE 6 OR MORE DRINKS ON ONE OCCASION: NEVER
SKIP TO QUESTIONS 9-10: 1
HOW MANY STANDARD DRINKS CONTAINING ALCOHOL DO YOU HAVE ON A TYPICAL DAY: PATIENT DOES NOT DRINK
HOW OFTEN DO YOU HAVE A DRINK CONTAINING ALCOHOL: NEVER
AUDIT-C TOTAL SCORE: 0
AUDIT-C TOTAL SCORE: 0

## 2025-01-03 ASSESSMENT — PAIN - FUNCTIONAL ASSESSMENT: PAIN_FUNCTIONAL_ASSESSMENT: 0-10

## 2025-01-03 ASSESSMENT — PAIN DESCRIPTION - LOCATION
LOCATION: ABDOMEN
LOCATION: ABDOMEN

## 2025-01-03 NOTE — ED PROVIDER NOTES
Oncoming physician note from Dr. Branden Frederick    I assumed care of the patient on 01/02/25 at 10:14 PM     I reviewed the chart, labs and imaging. I talked to the off going physician. I personally saw the patient and made/approved the management plan and take responsibility for the patient management.     Patient complains of severe abdominal discomfort requiring IV pain medication.  She has a known mass that was found on previous imaging.  Repeat imaging suggests worsening inflammation around it.  We spoke to OB and performed ultrasound and a CAT scan and compared with previous.  She is stable at this time is receiving pain medicine and is awaiting transfer since the in-house laborist recommends we transfer [facility].  Patient was accepted is awaiting the transfer at this time.  ED Course as of 01/02/25 2214   Thu Jan 02, 2025   1831 ED Attending Attestation: 64-year-old female with recently diagnosed large pelvic mass presenting to the emergency department with severe pelvic abdominal pain.  Was supposed to get outpatient pelvic MRI for assessment of the mass and follow-up with OB/GYN, the pain has been too severe and she has had change in her bowel habits.  I reviewed her workup from outside hospital, reviewed her CT imaging that demonstrated the pelvic mass, limited to bowel obstruction.  She is still passing gas as I do not think she has a complete bowel obstruction.  We did an ultrasound that redemonstrates the large mass.  Blood work was completed as well.  She is tachycardic and has a slight leukocytosis, but I suspect this is more secondary to pain in the mass and I do not suspect infection or sepsis at this time.  Will discuss with OB/GYN on how to best manage with patient symptoms.  She may require admission because of this. [SH]      ED Course User Index  [SH] Miguel Ledezma MD         Diagnoses as of 01/02/25 2214   Pelvic mass   Abdominal pain, unspecified abdominal location   Nausea and vomiting,  unspecified vomiting type        Branden Frederick MD  01/02/25 [id number]

## 2025-01-03 NOTE — H&P
History Of Present Illness  Lynette Garcia is a 64 y.o. female with newly diagnosed pelvic mass who presents as transfer from [facility] for abdominal pain. Patient was previously admitted to [facility] for persistent n/v, found to have pelvic mass at that time with concern for possible torsion of the mass. Unclear etiology of uterine vs adnexal. She reports that n/v has since resolved, however she re-presented to [facility] ED for worsening pain in the low pelvis. Patient is passing flatus and stool. Has decreased appetite and experiences significant bloating with eating, so has been only taking liquids today.     Work up at [facility] notable for normal lactate, lipase. Slight leukocytosis to 12.5 but without additional infectious signs. CT A/p demonstrated mass without evidence of bowel obstruction.  Tumor markers including CA 19-9, CEA, and  all wnl.      Past Medical History  Vertigo    Surgical History  She has a past surgical history that includes Other surgical history (04/14/2014); Other surgical history (04/14/2014); Mouth surgery (04/14/2014); Nasal septum surgery; and Nose surgery.      Social History  She reports that she has never smoked. She has never used smokeless tobacco. She reports that she does not currently use alcohol. She reports that she does not use drugs.     Allergies  Antihistamine-1, Aspirin, Cefixime, Epinephrine, Erythromycin, Lactose, Nsaids (non-steroidal anti-inflammatory drug), Penicillins, Pseudoephedrine, and Sulfamethoxazole-trimethoprim    Review of Systems   All other systems reviewed and are negative.       Physical Exam  General: Well appearing, alert  HEENT: normocephalic, EOMI, clear sclera  Cardio: Warm and well perfused, RRR  Resp: breathing comfortably on room air, CTAB  Abd: firm in lower quadrant, mass palpated and tender to touch. Upper abdomen soft, nontender, nondistended  Neuro: grossly intact, no focal deficits  Extremities: full ROM, no calf tenderness  Psych: A&O x3,  "appropriate mood and affect     Last Recorded Vitals  Blood pressure 106/68, pulse 92, temperature 36.6 °C (97.9 °F), temperature source Temporal, resp. rate 18, height 1.6 m (5' 3\"), weight 75.1 kg (165 lb 9.6 oz), SpO2 94%.    Relevant Results  Labs in chart were reviewed.  CBC   Recent Labs     01/02/25  1652   WBC 12.5*   HGB 13.7   HCT 41.0        CMP   Recent Labs     01/02/25  1652   *   K 3.6      CO2 24   ANIONGAP 13   BUN 11   CREATININE 0.85   EGFR 76   CALCIUM 10.2   ALBUMIN 4.3   PROT 7.2   ALKPHOS 68   ALT 28   AST 19   BILITOT 1.2     Coag     Lactate  Recent Labs     01/02/25  1652   LACTATE 0.8      Tumor markers  Cancer AG 19-9  <35.00 U/mL 8.18     Carcinoembryonic AG  ug/L 1.5     Cancer   0.0 - 30.2 U/mL 25.1        Assessment/Plan   Lynette Garcia is a 64 y.o. female with newly diagnosed pelvic mass who presents as transfer from [facility] for abdominal pain.     Pelvic mass  - Known pelvic mass of unclear origin diagnosed 12/30. Low suspicion for torsion given patient is very well appearing and abdomen is tender only over area of mass. n/v also resolved, low suspicion for obstruction.  - for pelvic MRI to better characterize mass today  - regular diet as tolerated  - Ucx from OSH pending, will follow up. No clinical suspicion for UTI  - AC: lovenox ppx    Comorbidities: none    Dispo: admit to inpatient for further work up    Discussed with Dr. Martell, To be discussed with Dr. Katie Mcfarland MD, PGY-3  Gynecologic Oncology, pager: [phone]            "

## 2025-01-03 NOTE — PROGRESS NOTES
Pharmacy Medication History Review    Lynette Garcia is a 64 y.o. female admitted for Pelvic mass in female. Pharmacy reviewed the patient's [id number] medications and allergies for accuracy.    The list below reflects the updated PTA list.   Prior to Admission Medications   Prescriptions Last Dose Informant Patient Reported? Taking?   calcium carb/magnesium carb,ox (ANNA MARIE-MAG ORAL) Past Week Self Yes Yes   Sig: Take 1 tablet by mouth once daily as needed (low calcium).   flaxseed oiL 1,000 mg capsule Past Week Self Yes Yes   Sig: Take 1 capsule (1,000 mg) by mouth once daily.   multivitamin capsule Past Week Self Yes Yes   Sig: Take 1 capsule by mouth once daily.   ondansetron ODT (Zofran-ODT) 4 mg disintegrating tablet 12/30/2024 Self No No   Sig: Dissolve 1 tablet (4 mg) in the mouth every 8 hours if needed for nausea or vomiting.   sennosides (Senokot) 8.6 mg tablet Past Week Self Yes Yes   Sig: Take 1 tablet (8.6 mg) by mouth once daily.   traMADol (Ultram) 50 mg tablet   No No   Sig: Take 1 tablet (50 mg) by mouth every 6 hours if needed for severe pain (7 - 10) for up to 3 days.      Facility-Administered Medications: None        The list below reflects the updated allergy list. Please review each documented allergy for additional clarification and justification.  Allergies  Reviewed by Marika Howard RN on 1/2/2025        Severity Reactions Comments    Antihistamine-1 Not Specified Other Tachycardia    Aspirin Not Specified Unknown     Cefixime Not Specified Other splenic enlargement    Epinephrine Not Specified Unknown     Erythromycin Not Specified Unknown Splenic enlargment    Lactose Not Specified Unknown     Nsaids (non-steroidal Anti-inflammatory Drug) Not Specified Unknown     Penicillins Not Specified Other, Unknown splenic enlargment    Pseudoephedrine Not Specified Unknown     Sulfamethoxazole-trimethoprim Not Specified Other Splenic enlargment            Patient accepts M2B at discharge.  "Please review pricing with patient prior to dispense.    Sources:   Patient Interview - good historian able to confirm medications from verbally presented list and independently state directions for administration, and provide supplemental medication history  Admission MedRec Grid  OARRS - none recent?  EPIC medication dispense report    Medications ADDED:  Flax seed oil capsoule  Calcium-magnesium supplement  Medications CHANGED:  None  Medications REMOVED/MARKED NOT TAKING:   None     Additional Comments:  Patient took 1 dose of ondansetron and tramadol when first picked up prescriptons, but did not continue to use because found that cold packs worked better to alleviate pain  Endorses no chronic prescription medication use    Francy Castillo, PharmD  Transitions of Care Pharmacist  01/03/25     Secure Chat preferred   If no response call [phone] or Vocera \"Med Rec\"    "

## 2025-01-03 NOTE — CARE PLAN
Problem: Nutrition  Goal: Less than 5 days NPO/clear liquids  Outcome: Progressing  Goal: Oral intake greater than 50%  Outcome: Progressing  Goal: Oral intake greater 75%  Outcome: Progressing  Goal: Consume prescribed supplement  Outcome: Progressing  Goal: Adequate PO fluid intake  Outcome: Progressing  Goal: Nutrition support goals are met within 48 hrs  Outcome: Progressing  Goal: Nutrition support is meeting 75% of nutrient needs  Outcome: Progressing  Goal: Tube feed tolerance  Outcome: Progressing  Goal: BG  mg/dL  Outcome: Progressing  Goal: Lab values WNL  Outcome: Progressing  Goal: Electrolytes WNL  Outcome: Progressing  Goal: Promote healing  Outcome: Progressing  Goal: Maintain stable weight  Outcome: Progressing  Goal: Reduce weight from edema/fluid  Outcome: Progressing  Goal: Gradual weight gain  Outcome: Progressing  Goal: Improve ostomy output  Outcome: Progressing   The patient's goals for the shift include      The clinical goals for the shift include Pt will remain HDS and VSS throughout shift

## 2025-01-04 ENCOUNTER — PREP FOR PROCEDURE (OUTPATIENT)
Dept: OPERATING ROOM | Facility: HOSPITAL | Age: 65
End: 2025-01-04
Payer: MEDICARE

## 2025-01-04 DIAGNOSIS — R19.00 PELVIC MASS: Primary | ICD-10-CM

## 2025-01-04 LAB
ANION GAP SERPL CALC-SCNC: 11 MMOL/L (ref 10–20)
BACTERIA UR CULT: NORMAL
BUN SERPL-MCNC: 9 MG/DL (ref 6–23)
CALCIUM SERPL-MCNC: 10.2 MG/DL (ref 8.6–10.6)
CHLORIDE SERPL-SCNC: 104 MMOL/L (ref 98–107)
CO2 SERPL-SCNC: 27 MMOL/L (ref 21–32)
CREAT SERPL-MCNC: 0.74 MG/DL (ref 0.5–1.05)
EGFRCR SERPLBLD CKD-EPI 2021: 90 ML/MIN/1.73M*2
ERYTHROCYTE [DISTWIDTH] IN BLOOD BY AUTOMATED COUNT: 12.8 % (ref 11.5–14.5)
GLUCOSE SERPL-MCNC: 98 MG/DL (ref 74–99)
HCT VFR BLD AUTO: 36.1 % (ref 36–46)
HGB BLD-MCNC: 11.9 G/DL (ref 12–16)
MAGNESIUM SERPL-MCNC: 2.28 MG/DL (ref 1.6–2.4)
MCH RBC QN AUTO: 29.5 PG (ref 26–34)
MCHC RBC AUTO-ENTMCNC: 33 G/DL (ref 32–36)
MCV RBC AUTO: 89 FL (ref 80–100)
NRBC BLD-RTO: 0 /100 WBCS (ref 0–0)
PLATELET # BLD AUTO: 272 X10*3/UL (ref 150–450)
POTASSIUM SERPL-SCNC: 3.9 MMOL/L (ref 3.5–5.3)
RBC # BLD AUTO: 4.04 X10*6/UL (ref 4–5.2)
SODIUM SERPL-SCNC: 138 MMOL/L (ref 136–145)
WBC # BLD AUTO: 8.7 X10*3/UL (ref 4.4–11.3)

## 2025-01-04 PROCEDURE — 80048 BASIC METABOLIC PNL TOTAL CA: CPT

## 2025-01-04 PROCEDURE — 82374 ASSAY BLOOD CARBON DIOXIDE: CPT

## 2025-01-04 PROCEDURE — 85027 COMPLETE CBC AUTOMATED: CPT

## 2025-01-04 PROCEDURE — 2500000001 HC RX 250 WO HCPCS SELF ADMINISTERED DRUGS (ALT 637 FOR MEDICARE OP)

## 2025-01-04 PROCEDURE — 83735 ASSAY OF MAGNESIUM: CPT

## 2025-01-04 PROCEDURE — 36415 COLL VENOUS BLD VENIPUNCTURE: CPT

## 2025-01-04 PROCEDURE — 1170000001 HC PRIVATE ONCOLOGY ROOM DAILY

## 2025-01-04 RX ORDER — GABAPENTIN 600 MG/1
600 TABLET ORAL ONCE
OUTPATIENT
Start: 2025-01-04 | End: 2025-01-04

## 2025-01-04 RX ORDER — ACETAMINOPHEN 325 MG/1
975 TABLET ORAL ONCE
OUTPATIENT
Start: 2025-01-04 | End: 2025-01-04

## 2025-01-04 RX ORDER — HEPARIN SODIUM 5000 [USP'U]/ML
5000 INJECTION, SOLUTION INTRAVENOUS; SUBCUTANEOUS ONCE
OUTPATIENT
Start: 2025-01-04 | End: 2025-01-04

## 2025-01-04 RX ORDER — TRAMADOL HYDROCHLORIDE 50 MG/1
50 TABLET ORAL EVERY 6 HOURS PRN
Status: DISCONTINUED | OUTPATIENT
Start: 2025-01-04 | End: 2025-01-05 | Stop reason: HOSPADM

## 2025-01-04 RX ADMIN — TRAMADOL HYDROCHLORIDE 50 MG: 50 TABLET, COATED ORAL at 18:58

## 2025-01-04 RX ADMIN — TRAMADOL HYDROCHLORIDE 50 MG: 50 TABLET, COATED ORAL at 09:17

## 2025-01-04 ASSESSMENT — PAIN SCALES - GENERAL
PAINLEVEL_OUTOF10: 3
PAINLEVEL_OUTOF10: 7
PAINLEVEL_OUTOF10: 7

## 2025-01-04 ASSESSMENT — COGNITIVE AND FUNCTIONAL STATUS - GENERAL
DAILY ACTIVITIY SCORE: 24
DAILY ACTIVITIY SCORE: 24
MOBILITY SCORE: 24
MOBILITY SCORE: 24

## 2025-01-04 ASSESSMENT — PAIN SCALES - PAIN ASSESSMENT IN ADVANCED DEMENTIA (PAINAD)
TOTALSCORE: MEDICATION (SEE MAR)
TOTALSCORE: REPOSITIONED

## 2025-01-04 ASSESSMENT — PAIN - FUNCTIONAL ASSESSMENT: PAIN_FUNCTIONAL_ASSESSMENT: 0-10

## 2025-01-04 ASSESSMENT — PAIN DESCRIPTION - LOCATION
LOCATION: ABDOMEN
LOCATION: ABDOMEN

## 2025-01-04 ASSESSMENT — PAIN DESCRIPTION - DESCRIPTORS: DESCRIPTORS: DULL;PRESSURE

## 2025-01-04 NOTE — SIGNIFICANT EVENT
"To bedside for nursing report of patient demanding charcoal capsules and being agitated.    On arrival to bedside, patient adamant she needs charcoal capsules to \"flush the toxins\" from her body. States that we (the hospital) are attempting to get her addicted to narcotics in order to \"cover up her symptoms and experiment on her\". States that we want her to eat solid food to \"pretend like everything is OK.\"    When I attempted to ask additional clarifying questions to understand her perspective, patient became very agitated and again requested charcoal. I explained to the patient we do not have charcoal capsules on formulary and larger doses are only use for acute poisoning and it would not be safe or appropriate to give her this.     I also explained that the tramadol medication is only as needed per her request and she does not need to take this.   Offered lidocaine patches as alternative non-oral medication and she declined this. Patient only amenable to 200mg ibuprofen as needed. PRN tramadol order dc'd.     Patient does appear uncomfortable, holding abdomen. Physical exam not performed as patient desired me to leave the room. She drank some water and was less agitated on my departure.    Kim Mcfarland MD, PGY-3  Gynecologic Oncology, pager: [phone]    "

## 2025-01-04 NOTE — PROGRESS NOTES
"Lynette Garcia is a 64 y.o. female on day 1 of admission presenting with Pelvic mass in female.    Subjective   Patient feeling better.  Tolerating oatmeal, passing gas and having BMs pain controlled.       Objective     Physical Exam  Constitutional:       Appearance: Normal appearance. She is normal weight.   HENT:      Head: Normocephalic and atraumatic.   Cardiovascular:      Rate and Rhythm: Normal rate and regular rhythm.      Pulses: Normal pulses.      Heart sounds: Normal heart sounds.   Pulmonary:      Effort: Pulmonary effort is normal.      Breath sounds: Normal breath sounds.   Abdominal:      General: Abdomen is flat. Bowel sounds are normal.      Palpations: Abdomen is soft. There is mass.      Tenderness: There is no abdominal tenderness. There is no guarding or rebound.   Musculoskeletal:         General: No swelling. Normal range of motion.      Cervical back: Normal range of motion and neck supple.   Skin:     General: Skin is warm and dry.   Neurological:      General: No focal deficit present.      Mental Status: She is alert and oriented to person, place, and time. Mental status is at baseline.   Psychiatric:         Mood and Affect: Mood normal.         Behavior: Behavior normal.         Thought Content: Thought content normal.         Judgment: Judgment normal.         Last Recorded Vitals  Blood pressure 133/84, pulse 82, temperature 36.9 °C (98.4 °F), temperature source Temporal, resp. rate 18, height 1.6 m (5' 3\"), weight 75.1 kg (165 lb 9.6 oz), SpO2 98%.  Intake/Output last 3 Shifts:  I/O last 3 completed shifts:  In: 700 (9.3 mL/kg) [P.O.:700]  Out: 350 (4.7 mL/kg) [Urine:350 (0.1 mL/kg/hr)]  Weight: 75.1 kg       Assessment/Plan   Assessment & Plan  Pelvic mass in female    Lynette Garcia is a 64 y.o. female with newly diagnosed pelvic mass who presents as transfer from [facility] for abdominal pain.      Pelvic mass  - Pelvic MRI with low suspicion of cancer however discussed cannot " rule this out without surgery  - No indication for emergent surgery  - Continue to work on pain control, will try tylenol/tramadol  - Avoid reglan given c/f rxn  - Schedule for surgery with Dr. Weaver as an outpatient  - Discharge home when tolerating solids    Efren Martell MD  Seen with Dr. Weaver

## 2025-01-04 NOTE — CARE PLAN
The patient's goals for the shift include      The clinical goals for the shift include Patient ghada be safe, free from fall and comfortable.    Problem: Nutrition  Goal: Less than 5 days NPO/clear liquids  Outcome: Progressing  Goal: Oral intake greater than 50%  Outcome: Progressing  Goal: Oral intake greater 75%  Outcome: Progressing  Goal: Consume prescribed supplement  Outcome: Progressing  Goal: Adequate PO fluid intake  Outcome: Progressing  Goal: Nutrition support goals are met within 48 hrs  Outcome: Progressing  Goal: Nutrition support is meeting 75% of nutrient needs  Outcome: Progressing  Goal: Tube feed tolerance  Outcome: Progressing  Goal: BG  mg/dL  Outcome: Progressing  Goal: Lab values WNL  Outcome: Progressing  Goal: Electrolytes WNL  Outcome: Progressing  Goal: Promote healing  Outcome: Progressing  Goal: Maintain stable weight  Outcome: Progressing  Goal: Reduce weight from edema/fluid  Outcome: Progressing  Goal: Gradual weight gain  Outcome: Progressing  Goal: Improve ostomy output  Outcome: Progressing

## 2025-01-05 ENCOUNTER — PHARMACY VISIT (OUTPATIENT)
Dept: PHARMACY | Facility: CLINIC | Age: 65
End: 2025-01-05
Payer: COMMERCIAL

## 2025-01-05 VITALS
OXYGEN SATURATION: 96 % | WEIGHT: 165.6 LBS | SYSTOLIC BLOOD PRESSURE: 106 MMHG | DIASTOLIC BLOOD PRESSURE: 63 MMHG | RESPIRATION RATE: 18 BRPM | HEART RATE: 84 BPM | HEIGHT: 63 IN | TEMPERATURE: 98.2 F | BODY MASS INDEX: 29.34 KG/M2

## 2025-01-05 LAB
ANION GAP SERPL CALC-SCNC: 12 MMOL/L (ref 10–20)
BUN SERPL-MCNC: 8 MG/DL (ref 6–23)
CALCIUM SERPL-MCNC: 10 MG/DL (ref 8.6–10.6)
CHLORIDE SERPL-SCNC: 103 MMOL/L (ref 98–107)
CO2 SERPL-SCNC: 26 MMOL/L (ref 21–32)
CREAT SERPL-MCNC: 0.76 MG/DL (ref 0.5–1.05)
EGFRCR SERPLBLD CKD-EPI 2021: 87 ML/MIN/1.73M*2
ERYTHROCYTE [DISTWIDTH] IN BLOOD BY AUTOMATED COUNT: 12.9 % (ref 11.5–14.5)
GLUCOSE SERPL-MCNC: 97 MG/DL (ref 74–99)
HCT VFR BLD AUTO: 36.4 % (ref 36–46)
HGB BLD-MCNC: 11.6 G/DL (ref 12–16)
MAGNESIUM SERPL-MCNC: 2.13 MG/DL (ref 1.6–2.4)
MCH RBC QN AUTO: 28.6 PG (ref 26–34)
MCHC RBC AUTO-ENTMCNC: 31.9 G/DL (ref 32–36)
MCV RBC AUTO: 90 FL (ref 80–100)
NRBC BLD-RTO: 0 /100 WBCS (ref 0–0)
PLATELET # BLD AUTO: 322 X10*3/UL (ref 150–450)
POTASSIUM SERPL-SCNC: 3.7 MMOL/L (ref 3.5–5.3)
RBC # BLD AUTO: 4.05 X10*6/UL (ref 4–5.2)
SODIUM SERPL-SCNC: 137 MMOL/L (ref 136–145)
WBC # BLD AUTO: 8 X10*3/UL (ref 4.4–11.3)

## 2025-01-05 PROCEDURE — 83735 ASSAY OF MAGNESIUM: CPT

## 2025-01-05 PROCEDURE — 85027 COMPLETE CBC AUTOMATED: CPT

## 2025-01-05 PROCEDURE — RXMED WILLOW AMBULATORY MEDICATION CHARGE

## 2025-01-05 PROCEDURE — 2500000001 HC RX 250 WO HCPCS SELF ADMINISTERED DRUGS (ALT 637 FOR MEDICARE OP)

## 2025-01-05 PROCEDURE — 82374 ASSAY BLOOD CARBON DIOXIDE: CPT

## 2025-01-05 PROCEDURE — 99239 HOSP IP/OBS DSCHRG MGMT >30: CPT

## 2025-01-05 PROCEDURE — 80048 BASIC METABOLIC PNL TOTAL CA: CPT

## 2025-01-05 PROCEDURE — 2500000004 HC RX 250 GENERAL PHARMACY W/ HCPCS (ALT 636 FOR OP/ED)

## 2025-01-05 PROCEDURE — 36415 COLL VENOUS BLD VENIPUNCTURE: CPT

## 2025-01-05 RX ORDER — TRAMADOL HYDROCHLORIDE 50 MG/1
50 TABLET ORAL EVERY 6 HOURS PRN
Qty: 20 TABLET | Refills: 0 | Status: SHIPPED | OUTPATIENT
Start: 2025-01-05

## 2025-01-05 RX ORDER — AMOXICILLIN 250 MG
1 CAPSULE ORAL 2 TIMES DAILY
Status: DISCONTINUED | OUTPATIENT
Start: 2025-01-05 | End: 2025-01-05 | Stop reason: HOSPADM

## 2025-01-05 RX ADMIN — ENOXAPARIN SODIUM 40 MG: 100 INJECTION SUBCUTANEOUS at 06:14

## 2025-01-05 RX ADMIN — TRAMADOL HYDROCHLORIDE 50 MG: 50 TABLET, COATED ORAL at 08:46

## 2025-01-05 RX ADMIN — SENNOSIDES AND DOCUSATE SODIUM 1 TABLET: 50; 8.6 TABLET ORAL at 08:45

## 2025-01-05 ASSESSMENT — COGNITIVE AND FUNCTIONAL STATUS - GENERAL
MOBILITY SCORE: 24
DAILY ACTIVITIY SCORE: 24

## 2025-01-05 ASSESSMENT — PAIN DESCRIPTION - LOCATION: LOCATION: ABDOMEN

## 2025-01-05 ASSESSMENT — PAIN SCALES - GENERAL
PAINLEVEL_OUTOF10: 0 - NO PAIN
PAINLEVEL_OUTOF10: 7

## 2025-01-05 NOTE — PROGRESS NOTES
01/05/25 1305   Discharge Planning   Home or Post Acute Services None   Expected Discharge Disposition Home   Patient Choice   Provider Choice list and CMS website (https://medicare.gov/care-compare#search) for post-acute Quality and Resource Measure Data were provided and reviewed with:   (n/a)   Patient / Family choosing to utilize agency / facility established prior to hospitalization No   Stroke Family Assessment   Stroke Family Assessment Needed No   Intensity of Service   Intensity of Service 0-30 min     Pt has active discharge orders. TCC attempted to reach patient by hospital and personal phone to discuss discharge and evaluate needs. Bedside nurse also message via Epic secured message to ensure pt didn't have needs or if transportation would need to be arranged at discharge. Pt's spouse is present in hospital room to take her home to [place]. IMM2 letter sent to patient's Packet[employer] account.    Cari Devlin RN  (Weekend Transitional Care Coordinator-T[facility], send Epic message)

## 2025-01-05 NOTE — PROGRESS NOTES
"Lynette Garcia is a 64 y.o. female on day 2 of admission presenting with Pelvic mass in female.    Subjective   Pain improved overnight, currently 7/10 this morning however feels related to gas pain. Tolerating PO without nausea or emesis.         Objective     Physical Exam  Constitutional:       Appearance: Normal appearance. She is normal weight.   HENT:      Head: Normocephalic and atraumatic.   Cardiovascular:      Rate and Rhythm: Normal rate and regular rhythm.      Pulses: Normal pulses.      Heart sounds: Normal heart sounds.   Pulmonary:      Effort: Pulmonary effort is normal.      Breath sounds: Normal breath sounds.   Abdominal:      General: Abdomen is flat. Bowel sounds are normal.      Palpations: Abdomen is soft. There is mass.      Tenderness: There is no abdominal tenderness. There is no guarding or rebound.   Musculoskeletal:         General: No swelling. Normal range of motion.      Cervical back: Normal range of motion and neck supple.   Skin:     General: Skin is warm and dry.   Neurological:      General: No focal deficit present.      Mental Status: She is alert and oriented to person, place, and time. Mental status is at baseline.   Psychiatric:         Mood and Affect: Mood normal.         Behavior: Behavior normal.         Thought Content: Thought content normal.         Judgment: Judgment normal.         Last Recorded Vitals  Blood pressure 102/67, pulse 89, temperature 36.6 °C (97.8 °F), temperature source Temporal, resp. rate 18, height 1.6 m (5' 3\"), weight 75.1 kg (165 lb 9.6 oz), SpO2 99%.  Intake/Output last 3 Shifts:  I/O last 3 completed shifts:  In: - (0 mL/kg)   Out: 350 (4.7 mL/kg) [Urine:350 (0.1 mL/kg/hr)]  Weight: 75.1 kg       Assessment/Plan   Assessment & Plan  Pelvic mass in female    Lynette Garcia is a 64 y.o. female with newly diagnosed pelvic mass who presents as transfer from [facility] for abdominal pain.      Pelvic mass  Pain control  - Pelvic MRI with left adnexa " mass measuring 10 x 9.5 x 7.5 cm, low suspicion for neoplasm given characteristic on imaging however unable to definitely rule out without tissue diagnosis.  - No indication for emergent surgery  - Pain improved with tylenol/tramadol  - Avoid reglan given c/f rxn  - Schedule for surgery with Dr. Weaver as an outpatient      Dispo: likely discharge home today  Jaylene Ybarra MD PGY-3  Seen with Dr. Weaver

## 2025-01-07 PROBLEM — R19.00 PELVIC MASS: Status: ACTIVE | Noted: 2025-01-04

## 2025-01-08 LAB
ATRIAL RATE: 81 BPM
P AXIS: 74 DEGREES
P OFFSET: 195 MS
P ONSET: 142 MS
PR INTERVAL: 160 MS
Q ONSET: 222 MS
QRS COUNT: 13 BEATS
QRS DURATION: 92 MS
QT INTERVAL: 374 MS
QTC CALCULATION(BAZETT): 434 MS
QTC FREDERICIA: 413 MS
R AXIS: 35 DEGREES
T AXIS: 57 DEGREES
T OFFSET: 409 MS
VENTRICULAR RATE: 81 BPM

## 2025-01-10 ENCOUNTER — TELEPHONE (OUTPATIENT)
Dept: PRIMARY CARE | Facility: CLINIC | Age: 65
End: 2025-01-10
Payer: MEDICARE

## 2025-01-18 NOTE — HOSPITAL COURSE
Hosp Course  Lynette Garcia is a 63 y/o with a large pelvic mass who presented for scheduled LAURA, BSO. Frozen path of the mass was benign; however, please refer to the pertinent op note for further surgical details. Her post-operative course was largely uncomplicated. The patient was meeting all expected goals including ambulating, voiding, and tolerating a regular diet without n/v. Her pain was well controlled. Pt was discharged in stable condition on 1/... with home PT and planned outpatient follow-up with Marizol Vargas NP on 2/18/25.            [ ] PAT- not needed  [ ] Plan for overnight obs?   [ ] Consent NOT prepped  [x] Preop meds  [x] Add to list     Gynecologic Oncology Surgery History and Physical    Subjective   Lynette Garcia is a 64 y.o. with a large pelvic mass presenting for XL, TLH, BSO, possible staging.    PMH: vertigo  PSH: Mouth surgery, nasal surgery    Social History  Social History     Tobacco Use    Smoking status: Never    Smokeless tobacco: Never   Substance Use Topics    Alcohol use: Not Currently     Substance and Sexual Activity   Drug Use Never       Allergies  Antihistamine-1, Aspirin, Cefixime, Epinephrine, Erythromycin, Nsaids (non-steroidal anti-inflammatory drug), Penicillins, Pseudoephedrine, Reglan [metoclopramide hcl], and Sulfamethoxazole-trimethoprim     Medications  -no meds on chart review    ROS: negative except per HPI    Objective    Last Vitals  -To be obtained. Will fu    Physical Examination  General: no acute distress  HEENT: normocephalic, atraumatic  Heart: warm and well perfused  Lungs: breathing comfortably on room air  Abdomen: nondistended  Extremities: moving all extremities  Neuro: awake and conversant  Psych: appropriate mood and affect    Lab Review    Lab Results   Component Value Date    WBC 8.0 01/05/2025    HGB 11.6 (L) 01/05/2025    HCT 36.4 01/05/2025    MCV 90 01/05/2025     01/05/2025       Lab Results   Component Value Date    GLUCOSE  97 01/05/2025    CALCIUM 10.0 01/05/2025     01/05/2025    K 3.7 01/05/2025    CO2 26 01/05/2025     01/05/2025    BUN 8 01/05/2025    CREATININE 0.76 01/05/2025     Pertinent Path/Imaging  CT AP 12/30/24  IMPRESSION:  1. Heterogeneous solid-appearing mass in the central portion of the  pelvis measuring 9.6 cm x 10 cm. Findings concerning for a possible  ovarian neoplasm.  Suspect a left adnexal mass, although given the  location is a exophytic fibroid could have a similar appearance.   Recommend further evaluation with pelvic ultrasound and consider  contrast-enhanced MRI.  2. Small amount of pelvic free fluid.  3. Mild hepatic steatosis.  4. Bilateral renal angiomyolipomas.    MRI Pelvis 1/03/25  IMPRESSION:  1.  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 Differential diagnosis  includes fibrous lesion such as fibroma or fibrothecoma. Residual  endometrioma is felt to be less likely. Associated elongated  structure may represent twisted fallopian tube or other adnexal  structure.  2. Hemorrhagic right ovarian cyst.  3. Endometrial thickening. Correlation with direct visualization may  be of value to exclude neoplastic process in case of persistent  clinical concerns.  4. Nonspecific small volume pelvic free fluid.    Assessment/Plan     Lynette Garcia is a 64 y.o. with a large pelvic mass presenting for scheduled surgery.    Plan to proceed with exploratory laparotomy, total abdominal hysterectomy, bilateral salpingo-oophorectomy, possible staging, and any indicated procedures  Consent reviewed.    To be d/w Dr. Katie Martini MD PGY 1  Gyn Onc, Pager [phone]

## 2025-01-20 ENCOUNTER — ANESTHESIA EVENT (OUTPATIENT)
Dept: OPERATING ROOM | Facility: HOSPITAL | Age: 65
End: 2025-01-20
Payer: MEDICARE

## 2025-01-20 RX ORDER — SIMETHICONE 125 MG
1 TABLET,CHEWABLE ORAL 3 TIMES DAILY
COMMUNITY

## 2025-01-20 RX ORDER — ACETAMINOPHEN 500 MG
500 TABLET ORAL EVERY 8 HOURS PRN
Status: ON HOLD | COMMUNITY
End: 2025-01-22

## 2025-01-20 NOTE — PROGRESS NOTES
Pharmacy Medication History Review    Lynette Garcia is a 64 y.o. female who is planned to be admitted for Pelvic mass. Pharmacy called the patient prior to their scheduled procedure and reviewed the patient's [id number] medications for accuracy.    Medications ADDED:  Tylenol 500mg  Simethicone 125mg  Medications CHANGED:  none  Medications REMOVED:   Calcium/magnesium supplement  Flaxseed oil  Ondansetron ODT 4mg  Senna 8.6mg  Tramadol 50mg    Please review updated prior to admission medication list and comments regarding how patient may be taking medications differently by going to Admission tab --> Admission Orders --> Admit Orders / Review prior to admission medications.     Preferred pharmacy, last doses of medications, and allergies to be confirmed with patient by nursing the day of procedure.     Sources used to complete the med history include:  [facility]  Pharmacy dispense history  Patient interview  Chart Review  Care Everywhere     Below are additional concerns with the patient's PTA list.  Patient states they are only taking #2 medications at this time. Tylenol PRN for pain and simethicone 125mg for gas and bloating    Narcisa Armstrong  [facility]  Please reach out via Secure Chat for questions or call [place] or Vocera [facility]

## 2025-01-21 ENCOUNTER — ANESTHESIA (OUTPATIENT)
Dept: OPERATING ROOM | Facility: HOSPITAL | Age: 65
End: 2025-01-21
Payer: MEDICARE

## 2025-01-21 ENCOUNTER — HOSPITAL ENCOUNTER (INPATIENT)
Facility: HOSPITAL | Age: 65
LOS: 3 days | Discharge: HOME | DRG: 742 | End: 2025-01-24
Attending: STUDENT IN AN ORGANIZED HEALTH CARE EDUCATION/TRAINING PROGRAM | Admitting: STUDENT IN AN ORGANIZED HEALTH CARE EDUCATION/TRAINING PROGRAM
Payer: MEDICARE

## 2025-01-21 DIAGNOSIS — K59.00 CONSTIPATION, UNSPECIFIED CONSTIPATION TYPE: ICD-10-CM

## 2025-01-21 DIAGNOSIS — R11.0 POSTOPERATIVE NAUSEA: ICD-10-CM

## 2025-01-21 DIAGNOSIS — Z98.890 POSTOPERATIVE STATE: ICD-10-CM

## 2025-01-21 DIAGNOSIS — Z98.890 POSTOPERATIVE NAUSEA: ICD-10-CM

## 2025-01-21 DIAGNOSIS — R19.00 PELVIC MASS: ICD-10-CM

## 2025-01-21 DIAGNOSIS — G89.18 POSTOPERATIVE PAIN: ICD-10-CM

## 2025-01-21 DIAGNOSIS — N83.8 OVARIAN MASS, LEFT: ICD-10-CM

## 2025-01-21 DIAGNOSIS — R19.00 PELVIC MASS IN FEMALE: ICD-10-CM

## 2025-01-21 DIAGNOSIS — R52 POSTPARTUM PAIN (HHS-HCC): Primary | ICD-10-CM

## 2025-01-21 DIAGNOSIS — G89.18 POST-OPERATIVE PAIN: ICD-10-CM

## 2025-01-21 DIAGNOSIS — R26.81 UNSTEADY GAIT: ICD-10-CM

## 2025-01-21 LAB
ABO GROUP (TYPE) IN BLOOD: NORMAL
ANTIBODY SCREEN: NORMAL
RH FACTOR (ANTIGEN D): NORMAL

## 2025-01-21 PROCEDURE — 2500000004 HC RX 250 GENERAL PHARMACY W/ HCPCS (ALT 636 FOR OP/ED): Performed by: ANESTHESIOLOGY

## 2025-01-21 PROCEDURE — 3600000008 HC OR TIME - EACH INCREMENTAL 1 MINUTE - PROCEDURE LEVEL THREE: Performed by: STUDENT IN AN ORGANIZED HEALTH CARE EDUCATION/TRAINING PROGRAM

## 2025-01-21 PROCEDURE — 3700000001 HC GENERAL ANESTHESIA TIME - INITIAL BASE CHARGE: Performed by: STUDENT IN AN ORGANIZED HEALTH CARE EDUCATION/TRAINING PROGRAM

## 2025-01-21 PROCEDURE — P9045 ALBUMIN (HUMAN), 5%, 250 ML: HCPCS | Mod: JZ,TB

## 2025-01-21 PROCEDURE — 2500000001 HC RX 250 WO HCPCS SELF ADMINISTERED DRUGS (ALT 637 FOR MEDICARE OP)

## 2025-01-21 PROCEDURE — 2500000004 HC RX 250 GENERAL PHARMACY W/ HCPCS (ALT 636 FOR OP/ED): Performed by: STUDENT IN AN ORGANIZED HEALTH CARE EDUCATION/TRAINING PROGRAM

## 2025-01-21 PROCEDURE — 2500000004 HC RX 250 GENERAL PHARMACY W/ HCPCS (ALT 636 FOR OP/ED)

## 2025-01-21 PROCEDURE — 0UT70ZZ RESECTION OF BILATERAL FALLOPIAN TUBES, OPEN APPROACH: ICD-10-PCS | Performed by: STUDENT IN AN ORGANIZED HEALTH CARE EDUCATION/TRAINING PROGRAM

## 2025-01-21 PROCEDURE — 0UT90ZZ RESECTION OF UTERUS, OPEN APPROACH: ICD-10-PCS | Performed by: STUDENT IN AN ORGANIZED HEALTH CARE EDUCATION/TRAINING PROGRAM

## 2025-01-21 PROCEDURE — 2500000004 HC RX 250 GENERAL PHARMACY W/ HCPCS (ALT 636 FOR OP/ED): Mod: JW,TB

## 2025-01-21 PROCEDURE — 7100000001 HC RECOVERY ROOM TIME - INITIAL BASE CHARGE: Performed by: STUDENT IN AN ORGANIZED HEALTH CARE EDUCATION/TRAINING PROGRAM

## 2025-01-21 PROCEDURE — 86901 BLOOD TYPING SEROLOGIC RH(D): CPT | Performed by: STUDENT IN AN ORGANIZED HEALTH CARE EDUCATION/TRAINING PROGRAM

## 2025-01-21 PROCEDURE — 88307 TISSUE EXAM BY PATHOLOGIST: CPT | Mod: TC,SUR | Performed by: STUDENT IN AN ORGANIZED HEALTH CARE EDUCATION/TRAINING PROGRAM

## 2025-01-21 PROCEDURE — 2500000001 HC RX 250 WO HCPCS SELF ADMINISTERED DRUGS (ALT 637 FOR MEDICARE OP): Performed by: STUDENT IN AN ORGANIZED HEALTH CARE EDUCATION/TRAINING PROGRAM

## 2025-01-21 PROCEDURE — 96372 THER/PROPH/DIAG INJ SC/IM: CPT | Performed by: STUDENT IN AN ORGANIZED HEALTH CARE EDUCATION/TRAINING PROGRAM

## 2025-01-21 PROCEDURE — 2500000005 HC RX 250 GENERAL PHARMACY W/O HCPCS: Performed by: ANESTHESIOLOGY

## 2025-01-21 PROCEDURE — 7100000002 HC RECOVERY ROOM TIME - EACH INCREMENTAL 1 MINUTE: Performed by: STUDENT IN AN ORGANIZED HEALTH CARE EDUCATION/TRAINING PROGRAM

## 2025-01-21 PROCEDURE — 3700000002 HC GENERAL ANESTHESIA TIME - EACH INCREMENTAL 1 MINUTE: Performed by: STUDENT IN AN ORGANIZED HEALTH CARE EDUCATION/TRAINING PROGRAM

## 2025-01-21 PROCEDURE — A58150 PR TOTAL ABDOM HYSTERECTOMY: Performed by: ANESTHESIOLOGY

## 2025-01-21 PROCEDURE — 3600000009 HC OR TIME - EACH INCREMENTAL 1 MINUTE - PROCEDURE LEVEL FOUR: Performed by: STUDENT IN AN ORGANIZED HEALTH CARE EDUCATION/TRAINING PROGRAM

## 2025-01-21 PROCEDURE — 3600000004 HC OR TIME - INITIAL BASE CHARGE - PROCEDURE LEVEL FOUR: Performed by: STUDENT IN AN ORGANIZED HEALTH CARE EDUCATION/TRAINING PROGRAM

## 2025-01-21 PROCEDURE — 0UT20ZZ RESECTION OF BILATERAL OVARIES, OPEN APPROACH: ICD-10-PCS | Performed by: STUDENT IN AN ORGANIZED HEALTH CARE EDUCATION/TRAINING PROGRAM

## 2025-01-21 PROCEDURE — 1170000001 HC PRIVATE ONCOLOGY ROOM DAILY

## 2025-01-21 PROCEDURE — 2500000005 HC RX 250 GENERAL PHARMACY W/O HCPCS: Performed by: STUDENT IN AN ORGANIZED HEALTH CARE EDUCATION/TRAINING PROGRAM

## 2025-01-21 PROCEDURE — 2720000007 HC OR 272 NO HCPCS: Performed by: STUDENT IN AN ORGANIZED HEALTH CARE EDUCATION/TRAINING PROGRAM

## 2025-01-21 PROCEDURE — 3600000003 HC OR TIME - INITIAL BASE CHARGE - PROCEDURE LEVEL THREE: Performed by: STUDENT IN AN ORGANIZED HEALTH CARE EDUCATION/TRAINING PROGRAM

## 2025-01-21 PROCEDURE — 88112 CYTOPATH CELL ENHANCE TECH: CPT | Mod: TC,MCY | Performed by: STUDENT IN AN ORGANIZED HEALTH CARE EDUCATION/TRAINING PROGRAM

## 2025-01-21 PROCEDURE — 58150 TOTAL HYSTERECTOMY: CPT | Performed by: STUDENT IN AN ORGANIZED HEALTH CARE EDUCATION/TRAINING PROGRAM

## 2025-01-21 PROCEDURE — 36415 COLL VENOUS BLD VENIPUNCTURE: CPT | Performed by: STUDENT IN AN ORGANIZED HEALTH CARE EDUCATION/TRAINING PROGRAM

## 2025-01-21 RX ORDER — LIDOCAINE HYDROCHLORIDE 20 MG/ML
INJECTION, SOLUTION INFILTRATION; PERINEURAL AS NEEDED
Status: DISCONTINUED | OUTPATIENT
Start: 2025-01-21 | End: 2025-01-21

## 2025-01-21 RX ORDER — OXYCODONE HYDROCHLORIDE 5 MG/1
5 TABLET ORAL EVERY 4 HOURS PRN
Status: DISCONTINUED | OUTPATIENT
Start: 2025-01-21 | End: 2025-01-21 | Stop reason: HOSPADM

## 2025-01-21 RX ORDER — ACETAMINOPHEN 160 MG/5ML
975 SOLUTION ORAL EVERY 6 HOURS
Status: DISCONTINUED | OUTPATIENT
Start: 2025-01-21 | End: 2025-01-24 | Stop reason: HOSPADM

## 2025-01-21 RX ORDER — PHENAZOPYRIDINE HYDROCHLORIDE 100 MG/1
95 TABLET, FILM COATED ORAL
Status: DISCONTINUED | OUTPATIENT
Start: 2025-01-22 | End: 2025-01-21

## 2025-01-21 RX ORDER — ONDANSETRON HYDROCHLORIDE 2 MG/ML
4 INJECTION, SOLUTION INTRAVENOUS ONCE AS NEEDED
Status: DISCONTINUED | OUTPATIENT
Start: 2025-01-21 | End: 2025-01-21 | Stop reason: HOSPADM

## 2025-01-21 RX ORDER — LIDOCAINE HYDROCHLORIDE 10 MG/ML
0.1 INJECTION, SOLUTION EPIDURAL; INFILTRATION; INTRACAUDAL; PERINEURAL ONCE
Status: DISCONTINUED | OUTPATIENT
Start: 2025-01-21 | End: 2025-01-21 | Stop reason: HOSPADM

## 2025-01-21 RX ORDER — FENTANYL CITRATE 50 UG/ML
INJECTION, SOLUTION INTRAMUSCULAR; INTRAVENOUS AS NEEDED
Status: DISCONTINUED | OUTPATIENT
Start: 2025-01-21 | End: 2025-01-21

## 2025-01-21 RX ORDER — HEPARIN SODIUM 5000 [USP'U]/ML
5000 INJECTION, SOLUTION INTRAVENOUS; SUBCUTANEOUS EVERY 8 HOURS
Status: DISCONTINUED | OUTPATIENT
Start: 2025-01-21 | End: 2025-01-22

## 2025-01-21 RX ORDER — HYDROMORPHONE HYDROCHLORIDE 1 MG/ML
0.4 INJECTION, SOLUTION INTRAMUSCULAR; INTRAVENOUS; SUBCUTANEOUS
Status: DISCONTINUED | OUTPATIENT
Start: 2025-01-21 | End: 2025-01-24 | Stop reason: HOSPADM

## 2025-01-21 RX ORDER — PROPOFOL 10 MG/ML
INJECTION, EMULSION INTRAVENOUS AS NEEDED
Status: DISCONTINUED | OUTPATIENT
Start: 2025-01-21 | End: 2025-01-21

## 2025-01-21 RX ORDER — NALOXONE HYDROCHLORIDE 0.4 MG/ML
0.1 INJECTION, SOLUTION INTRAMUSCULAR; INTRAVENOUS; SUBCUTANEOUS EVERY 5 MIN PRN
Status: DISCONTINUED | OUTPATIENT
Start: 2025-01-21 | End: 2025-01-24 | Stop reason: HOSPADM

## 2025-01-21 RX ORDER — SODIUM CHLORIDE, SODIUM LACTATE, POTASSIUM CHLORIDE, CALCIUM CHLORIDE 600; 310; 30; 20 MG/100ML; MG/100ML; MG/100ML; MG/100ML
100 INJECTION, SOLUTION INTRAVENOUS CONTINUOUS
Status: DISCONTINUED | OUTPATIENT
Start: 2025-01-21 | End: 2025-01-21 | Stop reason: HOSPADM

## 2025-01-21 RX ORDER — ALBUMIN HUMAN 50 G/1000ML
SOLUTION INTRAVENOUS AS NEEDED
Status: DISCONTINUED | OUTPATIENT
Start: 2025-01-21 | End: 2025-01-21

## 2025-01-21 RX ORDER — POLYETHYLENE GLYCOL 3350 17 G/17G
17 POWDER, FOR SOLUTION ORAL DAILY
Status: DISCONTINUED | OUTPATIENT
Start: 2025-01-21 | End: 2025-01-24 | Stop reason: HOSPADM

## 2025-01-21 RX ORDER — ONDANSETRON HYDROCHLORIDE 2 MG/ML
4 INJECTION, SOLUTION INTRAVENOUS EVERY 6 HOURS PRN
Status: DISCONTINUED | OUTPATIENT
Start: 2025-01-21 | End: 2025-01-24 | Stop reason: HOSPADM

## 2025-01-21 RX ORDER — HYDROMORPHONE HYDROCHLORIDE 0.2 MG/ML
0.2 INJECTION INTRAMUSCULAR; INTRAVENOUS; SUBCUTANEOUS EVERY 5 MIN PRN
Status: DISCONTINUED | OUTPATIENT
Start: 2025-01-21 | End: 2025-01-21 | Stop reason: HOSPADM

## 2025-01-21 RX ORDER — ONDANSETRON HYDROCHLORIDE 2 MG/ML
INJECTION, SOLUTION INTRAVENOUS AS NEEDED
Status: DISCONTINUED | OUTPATIENT
Start: 2025-01-21 | End: 2025-01-21

## 2025-01-21 RX ORDER — GABAPENTIN 600 MG/1
600 TABLET ORAL ONCE
Status: COMPLETED | OUTPATIENT
Start: 2025-01-21 | End: 2025-01-21

## 2025-01-21 RX ORDER — KETAMINE HYDROCHLORIDE 10 MG/ML
INJECTION, SOLUTION INTRAMUSCULAR; INTRAVENOUS AS NEEDED
Status: DISCONTINUED | OUTPATIENT
Start: 2025-01-21 | End: 2025-01-21

## 2025-01-21 RX ORDER — SODIUM CHLORIDE, SODIUM LACTATE, POTASSIUM CHLORIDE, CALCIUM CHLORIDE 600; 310; 30; 20 MG/100ML; MG/100ML; MG/100ML; MG/100ML
40 INJECTION, SOLUTION INTRAVENOUS CONTINUOUS
Status: DISCONTINUED | OUTPATIENT
Start: 2025-01-21 | End: 2025-01-22

## 2025-01-21 RX ORDER — WATER 1 ML/ML
IRRIGANT IRRIGATION AS NEEDED
Status: DISCONTINUED | OUTPATIENT
Start: 2025-01-21 | End: 2025-01-21 | Stop reason: HOSPADM

## 2025-01-21 RX ORDER — HEPARIN SODIUM 5000 [USP'U]/ML
5000 INJECTION, SOLUTION INTRAVENOUS; SUBCUTANEOUS ONCE
Status: COMPLETED | OUTPATIENT
Start: 2025-01-21 | End: 2025-01-21

## 2025-01-21 RX ORDER — SIMETHICONE 80 MG
80 TABLET,CHEWABLE ORAL 4 TIMES DAILY PRN
Status: DISCONTINUED | OUTPATIENT
Start: 2025-01-21 | End: 2025-01-22

## 2025-01-21 RX ORDER — OXYCODONE HYDROCHLORIDE 5 MG/1
5 TABLET ORAL EVERY 4 HOURS PRN
Status: DISCONTINUED | OUTPATIENT
Start: 2025-01-21 | End: 2025-01-24 | Stop reason: HOSPADM

## 2025-01-21 RX ORDER — DEXMEDETOMIDINE HYDROCHLORIDE 4 UG/ML
INJECTION, SOLUTION INTRAVENOUS CONTINUOUS PRN
Status: DISCONTINUED | OUTPATIENT
Start: 2025-01-21 | End: 2025-01-21

## 2025-01-21 RX ORDER — ONDANSETRON 4 MG/1
4 TABLET, ORALLY DISINTEGRATING ORAL EVERY 6 HOURS PRN
Status: DISCONTINUED | OUTPATIENT
Start: 2025-01-21 | End: 2025-01-24 | Stop reason: HOSPADM

## 2025-01-21 RX ORDER — GENTAMICIN 40 MG/ML
INJECTION, SOLUTION INTRAMUSCULAR; INTRAVENOUS AS NEEDED
Status: DISCONTINUED | OUTPATIENT
Start: 2025-01-21 | End: 2025-01-21

## 2025-01-21 RX ORDER — PHENYLEPHRINE HCL IN 0.9% NACL 0.4MG/10ML
SYRINGE (ML) INTRAVENOUS AS NEEDED
Status: DISCONTINUED | OUTPATIENT
Start: 2025-01-21 | End: 2025-01-21

## 2025-01-21 RX ORDER — OXYCODONE HYDROCHLORIDE 5 MG/1
10 TABLET ORAL EVERY 4 HOURS PRN
Status: DISCONTINUED | OUTPATIENT
Start: 2025-01-21 | End: 2025-01-24 | Stop reason: HOSPADM

## 2025-01-21 RX ORDER — PHENAZOPYRIDINE HYDROCHLORIDE 100 MG/1
95 TABLET, FILM COATED ORAL
Status: DISCONTINUED | OUTPATIENT
Start: 2025-01-21 | End: 2025-01-24 | Stop reason: HOSPADM

## 2025-01-21 RX ORDER — ACETAMINOPHEN 325 MG/1
975 TABLET ORAL ONCE
Status: COMPLETED | OUTPATIENT
Start: 2025-01-21 | End: 2025-01-21

## 2025-01-21 RX ORDER — MIDAZOLAM HYDROCHLORIDE 1 MG/ML
INJECTION INTRAMUSCULAR; INTRAVENOUS AS NEEDED
Status: DISCONTINUED | OUTPATIENT
Start: 2025-01-21 | End: 2025-01-21

## 2025-01-21 RX ORDER — ROCURONIUM BROMIDE 10 MG/ML
INJECTION, SOLUTION INTRAVENOUS AS NEEDED
Status: DISCONTINUED | OUTPATIENT
Start: 2025-01-21 | End: 2025-01-21

## 2025-01-21 RX ORDER — ACETAMINOPHEN 325 MG/1
975 TABLET ORAL EVERY 6 HOURS
Status: DISCONTINUED | OUTPATIENT
Start: 2025-01-21 | End: 2025-01-21

## 2025-01-21 RX ORDER — CLINDAMYCIN PHOSPHATE 150 MG/ML
INJECTION, SOLUTION INTRAVENOUS AS NEEDED
Status: DISCONTINUED | OUTPATIENT
Start: 2025-01-21 | End: 2025-01-21

## 2025-01-21 RX ORDER — HYDROMORPHONE HYDROCHLORIDE 1 MG/ML
INJECTION, SOLUTION INTRAMUSCULAR; INTRAVENOUS; SUBCUTANEOUS AS NEEDED
Status: DISCONTINUED | OUTPATIENT
Start: 2025-01-21 | End: 2025-01-21

## 2025-01-21 RX ORDER — METHOCARBAMOL 100 MG/ML
1000 INJECTION, SOLUTION INTRAMUSCULAR; INTRAVENOUS ONCE
Status: COMPLETED | OUTPATIENT
Start: 2025-01-21 | End: 2025-01-21

## 2025-01-21 RX ADMIN — ROCURONIUM 60 MG: 50 INJECTION, SOLUTION INTRAVENOUS at 11:13

## 2025-01-21 RX ADMIN — DEXAMETHASONE SODIUM PHOSPHATE 4 MG: 4 INJECTION INTRA-ARTICULAR; INTRALESIONAL; INTRAMUSCULAR; INTRAVENOUS; SOFT TISSUE at 11:38

## 2025-01-21 RX ADMIN — METHOCARBAMOL 1000 MG: 1000 INJECTION, SOLUTION INTRAMUSCULAR; INTRAVENOUS at 14:48

## 2025-01-21 RX ADMIN — GABAPENTIN 600 MG: 600 TABLET, FILM COATED ORAL at 09:55

## 2025-01-21 RX ADMIN — CLINDAMYCIN PHOSPHATE 900 MG: 150 INJECTION, SOLUTION INTRAMUSCULAR; INTRAVENOUS at 11:34

## 2025-01-21 RX ADMIN — ROCURONIUM 20 MG: 50 INJECTION, SOLUTION INTRAVENOUS at 11:55

## 2025-01-21 RX ADMIN — Medication 120 MCG: at 11:57

## 2025-01-21 RX ADMIN — HEPARIN SODIUM 5000 UNITS: 5000 INJECTION, SOLUTION INTRAVENOUS; SUBCUTANEOUS at 20:36

## 2025-01-21 RX ADMIN — ALBUMIN HUMAN 250 ML: 0.05 INJECTION, SOLUTION INTRAVENOUS at 13:00

## 2025-01-21 RX ADMIN — ACETAMINOPHEN 975 MG: 325 TABLET ORAL at 17:28

## 2025-01-21 RX ADMIN — Medication 10 MG: at 13:00

## 2025-01-21 RX ADMIN — SODIUM CHLORIDE, SODIUM LACTATE, POTASSIUM CHLORIDE, AND CALCIUM CHLORIDE: 600; 310; 30; 20 INJECTION, SOLUTION INTRAVENOUS at 11:08

## 2025-01-21 RX ADMIN — OXYCODONE 5 MG: 5 TABLET ORAL at 17:28

## 2025-01-21 RX ADMIN — ONDANSETRON 4 MG: 2 INJECTION, SOLUTION INTRAMUSCULAR; INTRAVENOUS at 13:19

## 2025-01-21 RX ADMIN — Medication 4 L/MIN: at 14:05

## 2025-01-21 RX ADMIN — HYDROMORPHONE HYDROCHLORIDE 0.2 MG: 0.2 INJECTION, SOLUTION INTRAMUSCULAR; INTRAVENOUS; SUBCUTANEOUS at 15:40

## 2025-01-21 RX ADMIN — Medication 120 MCG: at 13:41

## 2025-01-21 RX ADMIN — MIDAZOLAM HYDROCHLORIDE 2 MG: 1 INJECTION, SOLUTION INTRAMUSCULAR; INTRAVENOUS at 11:03

## 2025-01-21 RX ADMIN — HYDROMORPHONE HYDROCHLORIDE 0.5 MG: 1 INJECTION, SOLUTION INTRAMUSCULAR; INTRAVENOUS; SUBCUTANEOUS at 14:30

## 2025-01-21 RX ADMIN — Medication 200 MCG: at 12:13

## 2025-01-21 RX ADMIN — Medication 120 MCG: at 13:01

## 2025-01-21 RX ADMIN — Medication 80 MCG: at 12:54

## 2025-01-21 RX ADMIN — GENTAMICIN SULFATE 350 MG: 40 INJECTION, SOLUTION INTRAMUSCULAR; INTRAVENOUS at 11:34

## 2025-01-21 RX ADMIN — Medication 160 MCG: at 12:01

## 2025-01-21 RX ADMIN — SODIUM CHLORIDE, SODIUM LACTATE, POTASSIUM CHLORIDE, AND CALCIUM CHLORIDE: 600; 310; 30; 20 INJECTION, SOLUTION INTRAVENOUS at 13:01

## 2025-01-21 RX ADMIN — PROPOFOL 180 MG: 10 INJECTION, EMULSION INTRAVENOUS at 11:13

## 2025-01-21 RX ADMIN — Medication 120 MCG: at 13:17

## 2025-01-21 RX ADMIN — HYDROMORPHONE HYDROCHLORIDE 0.5 MG: 1 INJECTION, SOLUTION INTRAMUSCULAR; INTRAVENOUS; SUBCUTANEOUS at 12:00

## 2025-01-21 RX ADMIN — Medication 30 MG: at 11:59

## 2025-01-21 RX ADMIN — SODIUM CHLORIDE, POTASSIUM CHLORIDE, SODIUM LACTATE AND CALCIUM CHLORIDE 40 ML/HR: 600; 310; 30; 20 INJECTION, SOLUTION INTRAVENOUS at 17:16

## 2025-01-21 RX ADMIN — SODIUM CHLORIDE, POTASSIUM CHLORIDE, SODIUM LACTATE AND CALCIUM CHLORIDE 500 ML: 600; 310; 30; 20 INJECTION, SOLUTION INTRAVENOUS at 15:27

## 2025-01-21 RX ADMIN — FENTANYL CITRATE 100 MCG: 50 INJECTION, SOLUTION INTRAMUSCULAR; INTRAVENOUS at 11:13

## 2025-01-21 RX ADMIN — LIDOCAINE HYDROCHLORIDE 50 MG: 20 INJECTION, SOLUTION INFILTRATION; PERINEURAL at 11:13

## 2025-01-21 RX ADMIN — OXYCODONE 10 MG: 5 TABLET ORAL at 21:42

## 2025-01-21 RX ADMIN — PHENAZOPYRIDINE 100 MG: 100 TABLET ORAL at 22:17

## 2025-01-21 RX ADMIN — DEXMEDETOMIDINE HYDROCHLORIDE 0.4 MCG/KG/HR: 4 INJECTION, SOLUTION INTRAVENOUS at 11:54

## 2025-01-21 RX ADMIN — Medication 120 MCG: at 12:05

## 2025-01-21 RX ADMIN — ACETAMINOPHEN 975 MG: 325 TABLET ORAL at 09:55

## 2025-01-21 RX ADMIN — HEPARIN SODIUM 5000 UNITS: 5000 INJECTION INTRAVENOUS; SUBCUTANEOUS at 09:59

## 2025-01-21 SDOH — SOCIAL STABILITY: SOCIAL INSECURITY: HAVE YOU HAD THOUGHTS OF HARMING ANYONE ELSE?: NO

## 2025-01-21 SDOH — SOCIAL STABILITY: SOCIAL NETWORK: HOW OFTEN DO YOU GET TOGETHER WITH FRIENDS OR RELATIVES?: PATIENT DECLINED

## 2025-01-21 SDOH — SOCIAL STABILITY: SOCIAL INSECURITY: DOES ANYONE TRY TO KEEP YOU FROM HAVING/CONTACTING OTHER FRIENDS OR DOING THINGS OUTSIDE YOUR HOME?: NO

## 2025-01-21 SDOH — SOCIAL STABILITY: SOCIAL INSECURITY: HAVE YOU HAD ANY THOUGHTS OF HARMING ANYONE ELSE?: NO

## 2025-01-21 SDOH — HEALTH STABILITY: PHYSICAL HEALTH
HOW OFTEN DO YOU NEED TO HAVE SOMEONE HELP YOU WHEN YOU READ INSTRUCTIONS, PAMPHLETS, OR OTHER WRITTEN MATERIAL FROM YOUR DOCTOR OR PHARMACY?: NEVER

## 2025-01-21 SDOH — HEALTH STABILITY: PHYSICAL HEALTH: ON AVERAGE, HOW MANY MINUTES DO YOU ENGAGE IN EXERCISE AT THIS LEVEL?: PATIENT DECLINED

## 2025-01-21 SDOH — SOCIAL STABILITY: SOCIAL NETWORK
DO YOU BELONG TO ANY CLUBS OR ORGANIZATIONS SUCH AS CHURCH GROUPS, UNIONS, FRATERNAL OR ATHLETIC GROUPS, OR SCHOOL GROUPS?: PATIENT DECLINED

## 2025-01-21 SDOH — SOCIAL STABILITY: SOCIAL NETWORK: HOW OFTEN DO YOU ATTEND MEETINGS OF THE CLUBS OR ORGANIZATIONS YOU BELONG TO?: PATIENT DECLINED

## 2025-01-21 SDOH — ECONOMIC STABILITY: FOOD INSECURITY: HOW HARD IS IT FOR YOU TO PAY FOR THE VERY BASICS LIKE FOOD, HOUSING, MEDICAL CARE, AND HEATING?: SOMEWHAT HARD

## 2025-01-21 SDOH — SOCIAL STABILITY: SOCIAL INSECURITY: WITHIN THE LAST YEAR, HAVE YOU BEEN AFRAID OF YOUR PARTNER OR EX-PARTNER?: NO

## 2025-01-21 SDOH — ECONOMIC STABILITY: HOUSING INSECURITY: AT ANY TIME IN THE PAST 12 MONTHS, WERE YOU HOMELESS OR LIVING IN A SHELTER (INCLUDING NOW)?: NO

## 2025-01-21 SDOH — SOCIAL STABILITY: SOCIAL NETWORK: IN A TYPICAL WEEK, HOW MANY TIMES DO YOU TALK ON THE PHONE WITH FAMILY, FRIENDS, OR NEIGHBORS?: PATIENT DECLINED

## 2025-01-21 SDOH — ECONOMIC STABILITY: HOUSING INSECURITY: IN THE PAST 12 MONTHS, HOW MANY TIMES HAVE YOU MOVED WHERE YOU WERE LIVING?: 0

## 2025-01-21 SDOH — SOCIAL STABILITY: SOCIAL NETWORK: HOW OFTEN DO YOU ATTEND CHURCH OR RELIGIOUS SERVICES?: PATIENT DECLINED

## 2025-01-21 SDOH — ECONOMIC STABILITY: FOOD INSECURITY: WITHIN THE PAST 12 MONTHS, YOU WORRIED THAT YOUR FOOD WOULD RUN OUT BEFORE YOU GOT THE MONEY TO BUY MORE.: NEVER TRUE

## 2025-01-21 SDOH — ECONOMIC STABILITY: HOUSING INSECURITY: IN THE LAST 12 MONTHS, WAS THERE A TIME WHEN YOU WERE NOT ABLE TO PAY THE MORTGAGE OR RENT ON TIME?: PATIENT DECLINED

## 2025-01-21 SDOH — SOCIAL STABILITY: SOCIAL INSECURITY: DO YOU FEEL UNSAFE GOING BACK TO THE PLACE WHERE YOU ARE LIVING?: NO

## 2025-01-21 SDOH — ECONOMIC STABILITY: FOOD INSECURITY: WITHIN THE PAST 12 MONTHS, THE FOOD YOU BOUGHT JUST DIDN'T LAST AND YOU DIDN'T HAVE MONEY TO GET MORE.: NEVER TRUE

## 2025-01-21 SDOH — ECONOMIC STABILITY: INCOME INSECURITY: IN THE PAST 12 MONTHS HAS THE ELECTRIC, GAS, OIL, OR WATER COMPANY THREATENED TO SHUT OFF SERVICES IN YOUR HOME?: NO

## 2025-01-21 SDOH — SOCIAL STABILITY: SOCIAL INSECURITY: DO YOU FEEL ANYONE HAS EXPLOITED OR TAKEN ADVANTAGE OF YOU FINANCIALLY OR OF YOUR PERSONAL PROPERTY?: NO

## 2025-01-21 SDOH — SOCIAL STABILITY: SOCIAL INSECURITY: ABUSE: ADULT

## 2025-01-21 SDOH — SOCIAL STABILITY: SOCIAL INSECURITY: ARE YOU OR HAVE YOU BEEN THREATENED OR ABUSED PHYSICALLY, EMOTIONALLY, OR SEXUALLY BY ANYONE?: NO

## 2025-01-21 SDOH — SOCIAL STABILITY: SOCIAL INSECURITY: ARE THERE ANY APPARENT SIGNS OF INJURIES/BEHAVIORS THAT COULD BE RELATED TO ABUSE/NEGLECT?: NO

## 2025-01-21 SDOH — ECONOMIC STABILITY: HOUSING INSECURITY: DO YOU FEEL UNSAFE GOING BACK TO THE PLACE WHERE YOU LIVE?: NO

## 2025-01-21 SDOH — SOCIAL STABILITY: SOCIAL INSECURITY: ARE YOU MARRIED, WIDOWED, DIVORCED, SEPARATED, NEVER MARRIED, OR LIVING WITH A PARTNER?: PATIENT DECLINED

## 2025-01-21 SDOH — SOCIAL STABILITY: SOCIAL INSECURITY: WITHIN THE LAST YEAR, HAVE YOU BEEN HUMILIATED OR EMOTIONALLY ABUSED IN OTHER WAYS BY YOUR PARTNER OR EX-PARTNER?: NO

## 2025-01-21 SDOH — SOCIAL STABILITY: SOCIAL INSECURITY: WERE YOU ABLE TO COMPLETE ALL THE BEHAVIORAL HEALTH SCREENINGS?: YES

## 2025-01-21 SDOH — HEALTH STABILITY: PHYSICAL HEALTH
ON AVERAGE, HOW MANY DAYS PER WEEK DO YOU ENGAGE IN MODERATE TO STRENUOUS EXERCISE (LIKE A BRISK WALK)?: PATIENT DECLINED

## 2025-01-21 SDOH — HEALTH STABILITY: MENTAL HEALTH: CURRENT SMOKER: 0

## 2025-01-21 SDOH — ECONOMIC STABILITY: TRANSPORTATION INSECURITY: IN THE PAST 12 MONTHS, HAS LACK OF TRANSPORTATION KEPT YOU FROM MEDICAL APPOINTMENTS OR FROM GETTING MEDICATIONS?: NO

## 2025-01-21 SDOH — SOCIAL STABILITY: SOCIAL INSECURITY: HAS ANYONE EVER THREATENED TO HURT YOUR FAMILY OR YOUR PETS?: NO

## 2025-01-21 SDOH — HEALTH STABILITY: MENTAL HEALTH
DO YOU FEEL STRESS - TENSE, RESTLESS, NERVOUS, OR ANXIOUS, OR UNABLE TO SLEEP AT NIGHT BECAUSE YOUR MIND IS TROUBLED ALL THE TIME - THESE DAYS?: PATIENT DECLINED

## 2025-01-21 SDOH — SOCIAL STABILITY: SOCIAL INSECURITY
ASK PARENT OR GUARDIAN: ARE THERE TIMES WHEN YOU, YOUR CHILD(REN), OR ANY MEMBER OF YOUR HOUSEHOLD FEEL UNSAFE, HARMED, OR THREATENED AROUND PERSONS WITH WHOM YOU KNOW OR LIVE?: NO

## 2025-01-21 ASSESSMENT — ACTIVITIES OF DAILY LIVING (ADL)
BATHING: INDEPENDENT
TOILETING: INDEPENDENT
HEARING - LEFT EAR: FUNCTIONAL
DRESSING YOURSELF: INDEPENDENT
WALKS IN HOME: INDEPENDENT
ADEQUATE_TO_COMPLETE_ADL: YES
FEEDING YOURSELF: INDEPENDENT
LACK_OF_TRANSPORTATION: NO
JUDGMENT_ADEQUATE_SAFELY_COMPLETE_DAILY_ACTIVITIES: YES
HEARING - RIGHT EAR: FUNCTIONAL
GROOMING: INDEPENDENT
PATIENT'S MEMORY ADEQUATE TO SAFELY COMPLETE DAILY ACTIVITIES?: YES
LACK_OF_TRANSPORTATION: NO
ASSISTIVE_DEVICE: EYEGLASSES

## 2025-01-21 ASSESSMENT — COGNITIVE AND FUNCTIONAL STATUS - GENERAL
PERSONAL GROOMING: A LITTLE
MOBILITY SCORE: 17
DRESSING REGULAR UPPER BODY CLOTHING: A LITTLE
HELP NEEDED FOR BATHING: A LITTLE
DAILY ACTIVITIY SCORE: 18
PATIENT BASELINE BEDBOUND: NO
DRESSING REGULAR UPPER BODY CLOTHING: A LITTLE
DRESSING REGULAR LOWER BODY CLOTHING: A LITTLE
CLIMB 3 TO 5 STEPS WITH RAILING: A LOT
TURNING FROM BACK TO SIDE WHILE IN FLAT BAD: A LITTLE
TOILETING: A LITTLE
EATING MEALS: A LITTLE
WALKING IN HOSPITAL ROOM: A LITTLE
WALKING IN HOSPITAL ROOM: A LITTLE
MOVING FROM LYING ON BACK TO SITTING ON SIDE OF FLAT BED WITH BEDRAILS: A LITTLE
MOVING TO AND FROM BED TO CHAIR: A LITTLE
DRESSING REGULAR LOWER BODY CLOTHING: A LITTLE
MOVING TO AND FROM BED TO CHAIR: A LITTLE
STANDING UP FROM CHAIR USING ARMS: A LITTLE
TURNING FROM BACK TO SIDE WHILE IN FLAT BAD: A LITTLE
PERSONAL GROOMING: A LITTLE
MOBILITY SCORE: 17
TOILETING: A LITTLE
STANDING UP FROM CHAIR USING ARMS: A LITTLE
CLIMB 3 TO 5 STEPS WITH RAILING: A LOT
DAILY ACTIVITIY SCORE: 19
MOVING FROM LYING ON BACK TO SITTING ON SIDE OF FLAT BED WITH BEDRAILS: A LITTLE
HELP NEEDED FOR BATHING: A LITTLE

## 2025-01-21 ASSESSMENT — PAIN - FUNCTIONAL ASSESSMENT
PAIN_FUNCTIONAL_ASSESSMENT: UNABLE TO SELF-REPORT
PAIN_FUNCTIONAL_ASSESSMENT: 0-10
PAIN_FUNCTIONAL_ASSESSMENT: UNABLE TO SELF-REPORT
PAIN_FUNCTIONAL_ASSESSMENT: 0-10
PAIN_FUNCTIONAL_ASSESSMENT: UNABLE TO SELF-REPORT

## 2025-01-21 ASSESSMENT — PAIN SCALES - GENERAL
PAINLEVEL_OUTOF10: 6
PAINLEVEL_OUTOF10: 9
PAINLEVEL_OUTOF10: 3
PAINLEVEL_OUTOF10: 8
PAINLEVEL_OUTOF10: 10 - WORST POSSIBLE PAIN
PAINLEVEL_OUTOF10: 3
PAINLEVEL_OUTOF10: 0 - NO PAIN
PAINLEVEL_OUTOF10: 6
PAIN_LEVEL: 2
PAINLEVEL_OUTOF10: 6

## 2025-01-21 ASSESSMENT — PAIN DESCRIPTION - ORIENTATION: ORIENTATION: LOWER

## 2025-01-21 ASSESSMENT — PAIN DESCRIPTION - LOCATION: LOCATION: ABDOMEN

## 2025-01-21 ASSESSMENT — LIFESTYLE VARIABLES
HOW MANY STANDARD DRINKS CONTAINING ALCOHOL DO YOU HAVE ON A TYPICAL DAY: PATIENT DOES NOT DRINK
SUBSTANCE_ABUSE_PAST_12_MONTHS: NO
AUDIT-C TOTAL SCORE: 0
HOW OFTEN DO YOU HAVE 6 OR MORE DRINKS ON ONE OCCASION: NEVER
SKIP TO QUESTIONS 9-10: 1
HOW OFTEN DO YOU HAVE A DRINK CONTAINING ALCOHOL: NEVER
PRESCIPTION_ABUSE_PAST_12_MONTHS: NO
AUDIT-C TOTAL SCORE: 0

## 2025-01-21 ASSESSMENT — PAIN SCALES - PAIN ASSESSMENT IN ADVANCED DEMENTIA (PAINAD): TOTALSCORE: MEDICATION (SEE MAR)

## 2025-01-21 ASSESSMENT — PATIENT HEALTH QUESTIONNAIRE - PHQ9
2. FEELING DOWN, DEPRESSED OR HOPELESS: NOT AT ALL
SUM OF ALL RESPONSES TO PHQ9 QUESTIONS 1 & 2: 0
1. LITTLE INTEREST OR PLEASURE IN DOING THINGS: NOT AT ALL

## 2025-01-21 NOTE — ANESTHESIA PROCEDURE NOTES
Airway  Date/Time: 1/21/2025 11:19 AM  Urgency: elective    Airway not difficult    Staffing  Performed: resident   Authorized by: Albert Dumont MD    Performed by: Desmond Stearns MD  Patient location during procedure: OR    Indications and Patient Condition  Indications for airway management: anesthesia  Spontaneous Ventilation: absent  Sedation level: deep  Preoxygenated: yes  Patient position: sniffing  Mask difficulty assessment: 2 - vent by mask + OA or adjuvant +/- NMBA  Planned trial extubation    Final Airway Details  Final airway type: endotracheal airway      Successful airway: ETT  Cuffed: yes   Successful intubation technique: video laryngoscopy  Facilitating devices/methods: intubating stylet  Endotracheal tube insertion site: oral  Blade: Beto  Blade size: #3  ETT size (mm): 7.0  Cormack-Lehane Classification: grade I - full view of glottis  Placement verified by: chest auscultation and capnometry   Inital cuff pressure (cm H2O): 20  Cuff volume (mL): 7  Measured from: lips  ETT to lips (cm): 22  Number of attempts at approach: 1

## 2025-01-21 NOTE — H&P
Gynecologic Oncology Surgery History and Physical    Subjective   Lynette Garcia is a 64 y.o. with a large pelvic mass presenting for XL, LAURA, BSO, possible staging.    PMH: vertigo  PSH: Mouth surgery, nasal surgery    Social History  Social History     Tobacco Use    Smoking status: Never    Smokeless tobacco: Never   Substance Use Topics    Alcohol use: Not Currently     Substance and Sexual Activity   Drug Use Never       Allergies  Antihistamine-1, Aspirin, Cefixime, Epinephrine, Erythromycin, Nsaids (non-steroidal anti-inflammatory drug), Penicillins, Pseudoephedrine, Reglan [metoclopramide hcl], and Sulfamethoxazole-trimethoprim     Medications  -no meds on chart review    ROS: negative except per HPI    Objective    Last Vitals  Vitals:    01/21/25 0919   BP: 178/81   Pulse: 86   Resp: 18   Temp: 36 °C (96.8 °F)   SpO2: 99%        Physical Examination  General: no acute distress  HEENT: normocephalic, atraumatic  Heart: warm and well perfused  Lungs: breathing comfortably on room air  Abdomen: nondistended  Extremities: moving all extremities  Neuro: awake and conversant  Psych: appropriate mood and affect    Lab Review    Lab Results   Component Value Date    WBC 8.0 01/05/2025    HGB 11.6 (L) 01/05/2025    HCT 36.4 01/05/2025    MCV 90 01/05/2025     01/05/2025       Lab Results   Component Value Date    GLUCOSE 97 01/05/2025    CALCIUM 10.0 01/05/2025     01/05/2025    K 3.7 01/05/2025    CO2 26 01/05/2025     01/05/2025    BUN 8 01/05/2025    CREATININE 0.76 01/05/2025     Pertinent Path/Imaging  CT AP 12/30/24  IMPRESSION:  1. Heterogeneous solid-appearing mass in the central portion of the  pelvis measuring 9.6 cm x 10 cm. Findings concerning for a possible  ovarian neoplasm.  Suspect a left adnexal mass, although given the  location is a exophytic fibroid could have a similar appearance.   Recommend further evaluation with pelvic ultrasound and consider  contrast-enhanced MRI.  2.  Small amount of pelvic free fluid.  3. Mild hepatic steatosis.  4. Bilateral renal angiomyolipomas.    MRI Pelvis 1/03/25  IMPRESSION:  1.  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 Differential diagnosis  includes fibrous lesion such as fibroma or fibrothecoma. Residual  endometrioma is felt to be less likely. Associated elongated  structure may represent twisted fallopian tube or other adnexal  structure.  2. Hemorrhagic right ovarian cyst.  3. Endometrial thickening. Correlation with direct visualization may  be of value to exclude neoplastic process in case of persistent  clinical concerns.  4. Nonspecific small volume pelvic free fluid.    Assessment/Plan     Lynette Garcia is a 64 y.o. with a large pelvic mass presenting for scheduled surgery.    Plan to proceed with exploratory laparotomy, total abdominal hysterectomy, bilateral salpingo-oophorectomy, possible staging, and any indicated procedures  Consent reviewed.    To be d/w Dr. Katie Martini MD PGY 1  Gyn Onc, Pager [phone]

## 2025-01-21 NOTE — ANESTHESIA PROCEDURE NOTES
Peripheral IV  Date/Time: 1/21/2025 11:22 AM  Inserted by: Desmond Stearns MD    Placement  Needle size: 18 G  Laterality: right  Location: hand  Local anesthetic: none  Site prep: chlorhexidine  Technique: anatomical landmarks  Attempts: 1

## 2025-01-21 NOTE — OP NOTE
[facility]O Operative Note     Date: 2025  OR Location: [facility] OR    Name: Lynette Garcia, : 1960, Age: 64 y.o., MRN: [id number], Sex: female    Diagnosis  Pre-op Diagnosis      * Pelvic mass [R19.00] Post-op Diagnosis     * Pelvic mass [R19.00]     Procedures  THBSO  21137 - [place] EXPLORATORY LAPAROTOMY CELIOTOMY W/WO BIOPSY SPX  Exploratory laparotomy, total abdominal hysterectomy, bilateral salpingo-oophorectomy, removal of pelvic mass    Surgeons      * Laura Wilson - Primary    Resident/Fellow/Other Assistant:  Surgeons and Role:     * Yessi Noyola MD - Resident - Assisting     * Efren Martell MD - Fellow    Staff:   Circulator: Lobar  Scrub Person: Deborah Gamino Scrub: Paula Gamino Circulator: Sia    Anesthesia Staff: Anesthesiologist: Albert Dumont MD  Anesthesia Resident: Desmond Stearns MD    Procedure Summary  Anesthesia: General  ASA: III  Estimated Blood Loss: 25mL  Intra-op Medications:   Administrations occurring from 1015 to 1310 on 25:   Medication Name Total Dose   surgical lubricant gel 1 Application   albumin human bottle 5% 250 mL   clindamycin (Cleocin) 150 mg/mL 900 mg   dexAMETHasone (Decadron) 4 mg/mL 4 mg   dexmedeTOMIDine 4 mcg/mL in 100 mL NS infusion 35.57 mcg   dexmedeTOMIDine (Precedex) bolus from bag 12 mcg   fentaNYL (Sublimaze) injection 50 mcg/mL 100 mcg   gentamicin 40 mg/mL 350 mg   HYDROmorphone (Dilaudid) injection 1 mg/mL 0.5 mg   ketamine (Ketalar) 10 mg/mL injection 40 mg   LR bolus Cannot be calculated   lidocaine (Xylocaine) injection 2 % 50 mg   midazolam PF (Versed) injection 1 mg/mL 2 mg   phenylephrine 40 mcg/mL syringe 10 mL 800 mcg   propofol (Diprivan) injection 10 mg/mL 180 mg   rocuronium (ZeMuron) 50 mg/5 mL injection 80 mg              Anesthesia Record               Intraprocedure I/O Totals          Intake    Dexmedetomidine 0.00 mL    The total shown is the total volume documented since Anesthesia Start was filed.    Ketamine  0.00 mL    The total shown is the total volume documented since Anesthesia Start was filed.    LR bolus 1000.00 mL    Total Intake 1000 mL          Specimen:   ID Type Source Tests Collected by Time   1 : PELVIC WASHINGS Non-Gynecologic Cytology PELVIC WASHING CYTOLOGY CONSULTATION (NON-GYNECOLOGIC) Laura Wilson MD MPH 1/21/2025 1153   2 : PELVIC MASS Tissue PELVIC MASS RESECTION SURGICAL PATHOLOGY EXAM Laura Wilson MD MPH 1/21/2025 1203   3 : UTERUS,CERVIX,BILATERAL TUBES AND OVARIES Tissue UTERUS, CERVIX, FALLOPIAN TUBES AND OVARIES BILATERAL SURGICAL PATHOLOGY EXAM Laura Wilson MD MPH 1/21/2025 1249                 Drains and/or Catheters:   Urethral Catheter Non-latex 16 Fr. (Active)       Findings: Large necrotic mass arising from left ovary suspected to have previously torsed (frozen = benign), adherent to surrounding bladder and sigmoid colon.  Normal uterus, right tube and ovary.  Adhesions from uterus to bladder.  Normal appendix, omentum, small and large intestine.    Indications: Lynette Garcia is an 64 y.o. female who is having surgery for Pelvic mass [R19.00].     The patient was seen in the preoperative area. The risks, benefits, complications, treatment options, non-operative alternatives, expected recovery and outcomes were discussed with the patient. The possibilities of reaction to medication, pulmonary aspiration, injury to surrounding structures, bleeding, recurrent infection, the need for additional procedures, failure to diagnose a condition, and creating a complication requiring transfusion or operation were discussed with the patient. The patient concurred with the proposed plan, giving informed consent.  The site of surgery was properly noted/marked if necessary per policy. The patient has been actively warmed in preoperative area. Preoperative antibiotics have been ordered and given within 1 hours of incision. Venous thrombosis prophylaxis have been ordered including bilateral  sequential compression devices and chemical prophylaxis    Procedure Details:   Consent was completed in pre-op holding area after discussing all risks/benefits/alternatives.     Pt was taken to OR with IV in place. Team huddle and timeout were performed with all appropriate team members.  Anesthesia was induced without difficulty. Pt was placed in dorsal lithotomy position in coleen stirrups and prepped and draped in the usual sterile fashion. A nieves catheter was placed.     New sterile gloves were donned and attention was turned to the abdomen.  A vertical skin incision was made and carried through the fascia. The peritoneum was entered sharply and the incision was extended with good visualization of the bladder. The abdomen was then explored with findings noted above.      Attention was first turned to the pelvic mass.  Pelvic washings were obtained. The mass was elevated out of the abdomen without difficulty and was noted to be arising from the left side.  It was loosely adherent to surrounding colon and bladder, these adhesions taken down with gently blunt dissection.  With gentle elevation out of the pelvis the mass detached from the ovary, this was sent for frozen which was benign.    Bookwalter retractor placed with great care to make sure that no retractors were impinging on any structures and the bowel packed away with moist towels.    Attention was turned to the right pelvic sidewall. The mass was elevated.  The pelvic sidewall was incised with bovie and the peritoneal incision was extended parallel to the IP ligament.  The ureter was identified and then a window was created between the ureter and the IP.  The infundibulopelvic ligament was then sealed and incised with ligasure.  The ovary was gently elevated and it's remaining peritoneal attachments were taken down with cautery, with caution to avoid the ureter.      Attention was turned to the left pelvic sidewall.  The adnexa was elevated.  The pelvic  sidewall was incised with bovie and the peritoneal incision was extended parallel to the IP ligament.  The ureter was identified and then a window was created between the ureter and the IP.  The infundibulopelvic ligament was then sealed and incised with ligasure.  The adnexa was gently elevated and it's remaining peritoneal attachments were taken down with cautery, with caution to avoid the ureter.      Attention was then turned to the patient's left side.  The anterior and posterior leaves of the broad ligaments were opened. The para-rectal space was unroofed and developed.  Then the same procedures were performed on the contralateral side. The bladder flap was created and the uterine arteries skeletonized.  The posterior peritoneum was skeletonized and dropped away from the posterior uterus with cautery.  The uterine vessels were then clamped, transected and suture ligated. The cardinal and uterosacaral ligaments were then sequentially clamped, cut and suture ligated.  We made anterior colpotomy and carried this circumferentially with bovie.  The vaginal cuff was closed with figure- of-eight #0 Vicryl suture.  All pedicles were inspected and found to be hemostatic.  We backfilled the bladder and this showed no bladder defects.  We ran the small bowel which revealed no  abnormalities other than inflammation of the ileum mesentery which had been loosely adherent to the mass.    The abdomen and pelvis were thoroughly irrigated.     The retroperitoneal and intraperitoneal spaces inspected and irrigated and found to be hemostatic.      The fascia was closed with two #1-0 PDS which were tied together just inferior to the arcuate line.  We closed subcutaneous tissue in multiple layers with 2-0 vicryl then skin with 3-0 monocryl.    All laps and instruments were removed and sponge, needle and instrument counts were correct X 2.    The patient was awoken from anesthesia without difficulty and was taken to PACU in stable  condition.      Efren Martell MD      Complications:  None; patient tolerated the procedure well.    Disposition: PACU - hemodynamically stable.  Condition: stable             Attending Attestation:     Laura Wilson  Phone Number: [phone]

## 2025-01-21 NOTE — ANESTHESIA POSTPROCEDURE EVALUATION
Patient: Lynette Garcia    Procedure Summary       Date: 01/21/25 Room / Location: [facility] OR 03 / [facility]    Anesthesia Start: 1108 Anesthesia Stop: 1409    Procedure: THBSO Diagnosis:       Pelvic mass      (Pelvic mass [R19.00])    Surgeons: Laura Wilson MD MPH Responsible Provider: Albert Dumont MD    Anesthesia Type: general ASA Status: 3            Anesthesia Type: general    Vitals Value Taken Time   /85 01/21/25 1410   Temp 36 °C (96.8 °F) 01/21/25 1405   Pulse 63 01/21/25 1406   Resp 5 01/21/25 1406   SpO2 100 % 01/21/25 1406   Vitals shown include unfiled device data.    Anesthesia Post Evaluation    Patient location during evaluation: PACU  Patient participation: complete - patient participated  Level of consciousness: sleepy but conscious  Pain score: 2  Pain management: adequate  Airway patency: patent  Cardiovascular status: stable, acceptable and blood pressure returned to baseline  Respiratory status: spontaneous ventilation, acceptable and face mask  Hydration status: acceptable  Postoperative Nausea and Vomiting: none        No notable events documented.

## 2025-01-21 NOTE — SIGNIFICANT EVENT
Postoperative Check    64 y.o. now POD#0 s/p LAURA, BSO, and removal of pelvic mass    Subjective  Patient reports abdominal pain worse with movement, feels that pain from catheter is most bothersome. Denies any chest pain, SOB. Tolerating PO liquids, did have small bout of emesis after going to the bathroom.    Objective  General: no acute distress  HEENT: normocephalic, atraumatic  Heart: normal rate, regular rhythm, no murmurs appreciated  Lungs: CTAB  Abdomen: soft, nondistended, mildly tender to palpation, no rebound/guarding. Midline vertical incision covered with island dressing with minimal shadowing.  : nieves in place draining clear yellow urine  Extremities: moving all extremities  Neuro: awake and conversant  Psych: appropriate mood and affect    A/P  64 y.o. now POD#0 s/p LAURA, BSO, and removal of pelvic mass    Postoperative state  Pain moderately controlled, current regimen: Tylenol q6hr, oxycodone prn, dilaudid prn  IS q1 hour while awake, at bedside, continue to encourage   LR at 40mL/hr until taking adequate PO, antiemetics as needed  Nieves in place, monitor output, will add pyridium prn for bladder spasms  Encourage ambulation   Follow up AM labs  Heparin/SCDs for DVT ppx    Comorbidities  Vertigo - no medications currently    Jaylene Ybarra MD PGY-3  GYN ONC [id number]

## 2025-01-21 NOTE — ANESTHESIA PREPROCEDURE EVALUATION
Patient: Lynette Garcia    Procedure Information       Date/Time: 01/21/25 1015    Procedure: LAPAROTOMY, EXPLORATORY, removal of pelvic mass, total abdominal hysterectomy, bilateral salpingo-oophorectomy, possible staging, any indicated procedures.    Location: [facility] OR  / [facility] OR    Surgeons: Laura Wilson MD MPH            Relevant Problems   Cardiac   (+) Hypercholesteremia      Musculoskeletal   (+) Spondylosis of lumbosacral region without myelopathy or radiculopathy      ID   (+) Lyme disease, acute       Clinical information reviewed:   Tobacco  Allergies  Meds   Med Hx  Surg Hx   Fam Hx  Soc Hx        NPO Detail:  NPO/Void Status  Date of Last Liquid: 01/21/25  Time of Last Liquid: 0600  Date of Last Solid: 01/21/25  Time of Last Solid: 0000  Last Intake Type: Clear fluids         Physical Exam    Airway  Mallampati: I  TM distance: >3 FB  Neck ROM: full     Cardiovascular - normal exam     Dental - normal exam     Pulmonary - normal exam     Abdominal - normal exam         Anesthesia Plan    History of general anesthesia?: yes  History of complications of general anesthesia?: no    ASA 3     general     The patient is not a current smoker.  Patient was not previously instructed to abstain from smoking on day of procedure.  Patient did not smoke on day of procedure.    intravenous induction   Postoperative administration of opioids is intended.  Anesthetic plan and risks discussed with patient.  Use of blood products discussed with patient who.    Plan discussed with resident.

## 2025-01-22 PROBLEM — R52 POSTPARTUM PAIN (HHS-HCC): Status: ACTIVE | Noted: 2025-01-22

## 2025-01-22 LAB
ANION GAP SERPL CALC-SCNC: 11 MMOL/L (ref 10–20)
BUN SERPL-MCNC: 9 MG/DL (ref 6–23)
CALCIUM SERPL-MCNC: 9.8 MG/DL (ref 8.6–10.6)
CHLORIDE SERPL-SCNC: 105 MMOL/L (ref 98–107)
CO2 SERPL-SCNC: 26 MMOL/L (ref 21–32)
CREAT SERPL-MCNC: 0.84 MG/DL (ref 0.5–1.05)
EGFRCR SERPLBLD CKD-EPI 2021: 77 ML/MIN/1.73M*2
ERYTHROCYTE [DISTWIDTH] IN BLOOD BY AUTOMATED COUNT: 12.8 % (ref 11.5–14.5)
GLUCOSE SERPL-MCNC: 94 MG/DL (ref 74–99)
HCT VFR BLD AUTO: 37.4 % (ref 36–46)
HGB BLD-MCNC: 12 G/DL (ref 12–16)
MAGNESIUM SERPL-MCNC: 2.08 MG/DL (ref 1.6–2.4)
MCH RBC QN AUTO: 28.6 PG (ref 26–34)
MCHC RBC AUTO-ENTMCNC: 32.1 G/DL (ref 32–36)
MCV RBC AUTO: 89 FL (ref 80–100)
NRBC BLD-RTO: 0 /100 WBCS (ref 0–0)
PLATELET # BLD AUTO: 406 X10*3/UL (ref 150–450)
POTASSIUM SERPL-SCNC: 4.1 MMOL/L (ref 3.5–5.3)
RBC # BLD AUTO: 4.19 X10*6/UL (ref 4–5.2)
SODIUM SERPL-SCNC: 138 MMOL/L (ref 136–145)
WBC # BLD AUTO: 8.5 X10*3/UL (ref 4.4–11.3)

## 2025-01-22 PROCEDURE — 2500000004 HC RX 250 GENERAL PHARMACY W/ HCPCS (ALT 636 FOR OP/ED): Performed by: NURSE PRACTITIONER

## 2025-01-22 PROCEDURE — 2500000001 HC RX 250 WO HCPCS SELF ADMINISTERED DRUGS (ALT 637 FOR MEDICARE OP)

## 2025-01-22 PROCEDURE — 97530 THERAPEUTIC ACTIVITIES: CPT | Mod: GP

## 2025-01-22 PROCEDURE — 2500000004 HC RX 250 GENERAL PHARMACY W/ HCPCS (ALT 636 FOR OP/ED)

## 2025-01-22 PROCEDURE — 36415 COLL VENOUS BLD VENIPUNCTURE: CPT

## 2025-01-22 PROCEDURE — 82565 ASSAY OF CREATININE: CPT

## 2025-01-22 PROCEDURE — 1170000001 HC PRIVATE ONCOLOGY ROOM DAILY

## 2025-01-22 PROCEDURE — 83735 ASSAY OF MAGNESIUM: CPT

## 2025-01-22 PROCEDURE — RXMED WILLOW AMBULATORY MEDICATION CHARGE

## 2025-01-22 PROCEDURE — 97161 PT EVAL LOW COMPLEX 20 MIN: CPT | Mod: GP

## 2025-01-22 PROCEDURE — 85027 COMPLETE CBC AUTOMATED: CPT

## 2025-01-22 RX ORDER — ENOXAPARIN SODIUM 100 MG/ML
40 INJECTION SUBCUTANEOUS DAILY
Status: DISCONTINUED | OUTPATIENT
Start: 2025-01-22 | End: 2025-01-24 | Stop reason: HOSPADM

## 2025-01-22 RX ORDER — LIDOCAINE 560 MG/1
1 PATCH PERCUTANEOUS; TOPICAL; TRANSDERMAL DAILY
Qty: 14 PATCH | Refills: 0 | Status: SHIPPED | OUTPATIENT
Start: 2025-01-22 | End: 2025-01-22 | Stop reason: HOSPADM

## 2025-01-22 RX ORDER — PHENAZOPYRIDINE HYDROCHLORIDE 100 MG/1
95 TABLET, FILM COATED ORAL 3 TIMES DAILY PRN
Qty: 20 TABLET | Refills: 0 | Status: SHIPPED | OUTPATIENT
Start: 2025-01-22

## 2025-01-22 RX ORDER — SIMETHICONE 80 MG
125 TABLET,CHEWABLE ORAL 3 TIMES DAILY
Status: DISCONTINUED | OUTPATIENT
Start: 2025-01-22 | End: 2025-01-23

## 2025-01-22 RX ORDER — OXYCODONE HYDROCHLORIDE 5 MG/1
5 TABLET ORAL EVERY 6 HOURS PRN
Qty: 15 TABLET | Refills: 0 | Status: SHIPPED | OUTPATIENT
Start: 2025-01-22

## 2025-01-22 RX ORDER — LIDOCAINE 560 MG/1
1 PATCH PERCUTANEOUS; TOPICAL; TRANSDERMAL DAILY
Status: DISCONTINUED | OUTPATIENT
Start: 2025-01-22 | End: 2025-01-22

## 2025-01-22 RX ORDER — POLYETHYLENE GLYCOL 3350 17 G/17G
POWDER, FOR SOLUTION ORAL
Qty: 238 G | Refills: 0 | Status: SHIPPED | OUTPATIENT
Start: 2025-01-22

## 2025-01-22 RX ORDER — IBUPROFEN 600 MG/1
600 TABLET ORAL EVERY 6 HOURS PRN
Qty: 60 TABLET | Refills: 0 | Status: SHIPPED | OUTPATIENT
Start: 2025-01-22

## 2025-01-22 RX ORDER — ONDANSETRON 4 MG/1
4 TABLET, ORALLY DISINTEGRATING ORAL EVERY 8 HOURS PRN
Qty: 20 TABLET | Refills: 0 | Status: SHIPPED | OUTPATIENT
Start: 2025-01-22

## 2025-01-22 RX ORDER — ACETAMINOPHEN 500 MG
1000 TABLET ORAL EVERY 8 HOURS PRN
Qty: 120 TABLET | Refills: 0 | Status: SHIPPED | OUTPATIENT
Start: 2025-01-22

## 2025-01-22 RX ADMIN — ACETAMINOPHEN 1000 MG: 160 SOLUTION ORAL at 00:50

## 2025-01-22 RX ADMIN — PHENAZOPYRIDINE 100 MG: 100 TABLET ORAL at 12:07

## 2025-01-22 RX ADMIN — SODIUM CHLORIDE, POTASSIUM CHLORIDE, SODIUM LACTATE AND CALCIUM CHLORIDE 40 ML/HR: 600; 310; 30; 20 INJECTION, SOLUTION INTRAVENOUS at 03:36

## 2025-01-22 RX ADMIN — PHENAZOPYRIDINE 100 MG: 100 TABLET ORAL at 17:36

## 2025-01-22 RX ADMIN — ACETAMINOPHEN 1000 MG: 160 SOLUTION ORAL at 12:07

## 2025-01-22 RX ADMIN — ENOXAPARIN SODIUM 40 MG: 100 INJECTION SUBCUTANEOUS at 12:08

## 2025-01-22 RX ADMIN — HEPARIN SODIUM 5000 UNITS: 5000 INJECTION, SOLUTION INTRAVENOUS; SUBCUTANEOUS at 03:48

## 2025-01-22 RX ADMIN — OXYCODONE 10 MG: 5 TABLET ORAL at 08:13

## 2025-01-22 RX ADMIN — ACETAMINOPHEN 1000 MG: 160 SOLUTION ORAL at 17:36

## 2025-01-22 RX ADMIN — PHENAZOPYRIDINE 100 MG: 100 TABLET ORAL at 08:14

## 2025-01-22 RX ADMIN — ACETAMINOPHEN 1000 MG: 160 SOLUTION ORAL at 06:10

## 2025-01-22 ASSESSMENT — COGNITIVE AND FUNCTIONAL STATUS - GENERAL
CLIMB 3 TO 5 STEPS WITH RAILING: A LITTLE
TOILETING: A LITTLE
WALKING IN HOSPITAL ROOM: A LITTLE
TURNING FROM BACK TO SIDE WHILE IN FLAT BAD: A LITTLE
DRESSING REGULAR LOWER BODY CLOTHING: A LITTLE
STANDING UP FROM CHAIR USING ARMS: A LITTLE
DRESSING REGULAR UPPER BODY CLOTHING: A LITTLE
TURNING FROM BACK TO SIDE WHILE IN FLAT BAD: A LITTLE
CLIMB 3 TO 5 STEPS WITH RAILING: A LITTLE
MOVING TO AND FROM BED TO CHAIR: A LITTLE
MOVING FROM LYING ON BACK TO SITTING ON SIDE OF FLAT BED WITH BEDRAILS: A LITTLE
MOBILITY SCORE: 18
MOBILITY SCORE: 18
STANDING UP FROM CHAIR USING ARMS: A LITTLE
DAILY ACTIVITIY SCORE: 19
HELP NEEDED FOR BATHING: A LITTLE
WALKING IN HOSPITAL ROOM: A LITTLE
PERSONAL GROOMING: A LITTLE
MOVING FROM LYING ON BACK TO SITTING ON SIDE OF FLAT BED WITH BEDRAILS: A LITTLE
MOVING TO AND FROM BED TO CHAIR: A LITTLE

## 2025-01-22 ASSESSMENT — PAIN SCALES - PAIN ASSESSMENT IN ADVANCED DEMENTIA (PAINAD): TOTALSCORE: MEDICATION (SEE MAR)

## 2025-01-22 ASSESSMENT — PAIN DESCRIPTION - ORIENTATION: ORIENTATION: LOWER;MID

## 2025-01-22 ASSESSMENT — PAIN SCALES - GENERAL: PAINLEVEL_OUTOF10: 6

## 2025-01-22 ASSESSMENT — PAIN DESCRIPTION - LOCATION: LOCATION: ABDOMEN

## 2025-01-22 ASSESSMENT — ACTIVITIES OF DAILY LIVING (ADL)
LACK_OF_TRANSPORTATION: NO
ADL_ASSISTANCE: INDEPENDENT

## 2025-01-22 ASSESSMENT — PAIN - FUNCTIONAL ASSESSMENT
PAIN_FUNCTIONAL_ASSESSMENT: UNABLE TO SELF-REPORT
PAIN_FUNCTIONAL_ASSESSMENT: 0-10

## 2025-01-22 NOTE — PROGRESS NOTES
01/22/25 1237   Discharge Planning   Living Arrangements Spouse/significant other   Support Systems Spouse/significant other   Assistance Needed None   Type of Residence Private residence   Number of Stairs to Enter Residence 6   Number of Stairs Within Residence 13   Do you have animals or pets at home? Yes   Type of Animals or Pets 1 cat, 1 dog   Who is requesting discharge planning? Provider   Home or Post Acute Services In home services   Expected Discharge Disposition Home H   Does the patient need discharge transport arranged? No   RoundTrip coordination needed? No   Financial Resource Strain   How hard is it for you to pay for the very basics like food, housing, medical care, and heating? Somewhat   Housing Stability   In the last 12 months, was there a time when you were not able to pay the mortgage or rent on time? N   In the past 12 months, how many times have you moved where you were living? 0   At any time in the past 12 months, were you homeless or living in a shelter (including now)? N   Transportation Needs   In the past 12 months, has lack of transportation kept you from medical appointments or from getting medications? no   In the past 12 months, has lack of transportation kept you from meetings, work, or from getting things needed for daily living? No     64 y.o. now post op s/p LAURA, BSO, and removal of pelvic mass on 1/21.     TCC Note: Met with patient and introduced myself as care coordinator and member of the care transitions team for discharge planning. Patient lives with  in a ranch home, does have steps to basement. Patient is independent, does not use assistive devices.  is primary caregiver but not a teachable caregiver for more extensive care.  Patient does not have a teachable caregiver if needed. [facility] is A[facility] if recommended. Patient states she would like a follow up appointment with MD closer to home. [facility] sent message to ERICKA Mcguire to see if patient has  options. Demographics and contacts verified. TCC will continue to follow for discharge needs. Kenyetta Valencia RN, TCC                                                 Home medical Equipment: None  DME: None  O2: None  Dialysis: N/A    Home Care: [facility] is AOC   PCP: Annamarie Storey DO  Pharm: [facility] [address]  Barriers: None at this time  Discharge Disposition: Home pending PT rec  ADOD: tomorrow vs Friday  Transport at Discharge:     Addendum @ 1:15pm  Per RADHA. Amador APRN-CNP, patient is technically the fellows patient, and they only see patients here. NP will reach out to the office to see if there is something closer for patient.

## 2025-01-22 NOTE — PROGRESS NOTES
"Lynette Garcia is a 64 y.o. female on day 1 of admission after LAURA, BSO, removal of pelvic mass.     Subjective   Pain improved overnight. Small emesis after drinking earlier in the night, but none since. Tolerating liquids. Has not tried solids. Voiding via nieves. Not yet passing flatus. Ambulated to the bathroom, had to hold onto rails.       Objective   Last Recorded Vitals  Blood pressure 102/70, pulse 71, temperature 36.3 °C (97.3 °F), temperature source Temporal, resp. rate 16, height 1.6 m (5' 3\"), weight 70.2 kg (154 lb 12.2 oz), SpO2 95%.  Intake/Output last 3 Shifts:    Intake/Output Summary (Last 24 hours) at 1/22/2025 0609  Last data filed at 1/22/2025 0345  Gross per 24 hour   Intake 2778.02 ml   Output 860 ml   Net 1918.02 ml     Physical Exam  General: no acute distress  HEENT: normocephalic, atraumatic  Heart: normal rate, regular rhythm, no murmurs appreciated  Lungs: CTAB, breathing comfortably on room air  Abdomen: soft, nondistended, mildly tender to palpation, no rebound/guarding. Midline vertical incision covered with island dressing with minimal shadowing.  : nieves in place draining clear orange urine  Extremities: moving all extremities  Neuro: awake and conversant  Psych: appropriate mood and affect    Assessment/Plan   64 y.o. now post op s/p LAURA, BSO, and removal of pelvic mass on 1/21.     Postoperative state  Pain currently well controlled, current regimen: Tylenol q6hr, oxycodone prn, dilaudid prn.   IS q1 hour while awake, at bedside, continue to encourage   LR at 40mL/hr discontinued now that patient is taking adequate PO. Antiemetics as needed  Nieves in place, output adequate - plan for removal this AM. Pyridium prn for bladder spasms.  Encourage ambulation. PT consulted today.  Follow up AM labs  Heparin/SCDs for DVT ppx     Comorbidities  Vertigo - no medications currently    To be Discussed with Dr. Owen Cervantes MD, PGY-2  [facility] P[phone]    "

## 2025-01-22 NOTE — DISCHARGE INSTRUCTIONS
Open Hysterectomy Discharge Instructions  If you have any questions about your care, please contact us at [phone].    Activity  No driving for 2 weeks.    No lifting over 10 lbs for 6 weeks  Use the railing for support when going up and down stairs.  Activity as tolerated  You may shower.   Do not take tub baths, go swimming or use a hot tub until instructed to do so  Pelvic Rest: You should not resume sexual activity or place anything inside your vagina at this time.  Your doctor will discuss recommendations at your follow-up appointment.  Notify Your Doctor or Nurse if you have any of the following  Wound Infection Symptoms  Call your doctor or nurse right away if you have signs of infection at your wound such as:  More pain around the wound  Change in the amount , color and odor of drainage  The skin around the wound feels warm or has red streaks  The wound separates or opens up  You have a temperature greater than 100.4  Deep Vein Thrombosis Symptoms  Call your doctor or nurse right away if you have any signs of blood clots such as:  Tender, swollen or reddened areas anywhere in your leg.  Numbness or tingling in your lower leg or calf, or at the top of your leg or groin  Skin on you leg looks pale or blue or feels cold to touch  Chest pain or have trouble breathing  Fever or chills  Fever or Chills  Call your doctor or nurse if you have a temperature greater than 100.4 degrees F that lasts more than 1 hour and/or chills.  Nausea and Vomiting     Call your doctor or nurse if you have nausea and vomiting that will not let you keep medicine down and will not let you keep fluids down  Vaginal Discharge or Bleeding  Call your doctor or nurse if foul smelling discharge form your vagina and heavy bleeding from your vagina that soaks 2 to 3 pads in 1 hour.  Unrelieved Pain  Call your doctor or nurse if your pain gets worse or is not eased 1 hour after taking your pain medicine.  Urinary Tract Infection  Symptoms  Call your doctor or nurse right away if you have signs of a urinary tract infection such as:  Urine is cloudy, bloody or has a bad odor  You leak urine or you have to urinate more often  You feel burning when you urinate  You have a temperature greater than 100.4  Incision Care  You do not need to cover your incision.  Clean it each day with mild soap and water.  Wash the incision daily and pat it dry. You may shower, but do not soak incision  even after staple removal until the wound is healed.  Do not apply any ointments, medications, creams or lotions to the wound.

## 2025-01-22 NOTE — PROGRESS NOTES
Physical Therapy    Physical Therapy Evaluation & Treatment    Patient Name: Lynette Garcia  MRN: [id number]  Department: [facility]  Room: 60/6022-A  Today's Date: 1/22/2025   Time Calculation  Start Time: 1534  Stop Time: 1622  Time Calculation (min): 48 min    Assessment/Plan   PT Assessment  PT Assessment Results: Decreased endurance, Impaired balance, Decreased mobility, Pain  Rehab Prognosis: Excellent  Barriers to Discharge Home: No anticipated barriers  Evaluation/Treatment Tolerance: Patient tolerated treatment well  Medical Staff Made Aware: Yes  Strengths: Attitude of self  Barriers to Participation: Comorbidities  End of Session Communication: Bedside nurse  Assessment Comment: 64 year old female s/p LAURA, BSO, removal of pelvic mass on 1/21/25. Pt presents with decreased strength, endurance and balance impacting functional mobility. Pt would benefit from continued PT while in hospital to improve functional mobility and return to PLOF.  End of Session Patient Position: Bed, 3 rail up, Alarm off, not on at start of session   IP OR SWING BED PT PLAN  Inpatient or Swing Bed: Inpatient  PT Plan  Treatment/Interventions: Bed mobility, Transfer training, Gait training, Stair training, Balance training, Neuromuscular re-education, Strengthening, Endurance training, Therapeutic exercise, Therapeutic activity, Home exercise program, Positioning, Postural re-education  PT Plan: Ongoing PT  PT Frequency: 4 times per week  PT Discharge Recommendations: Low intensity level of continued care  Equipment Recommended upon Discharge: Wheeled walker  PT Recommended Transfer Status: Assist x1, Assistive device  PT - OK to Discharge: Yes (PT eval complete and DC rec made)    Subjective     General Visit Information:  General  Reason for Referral: 64 year old female s/p LAURA, BSO, removal of pelvic mass on 1/21/25.  Past Medical History Relevant to Rehab: vertigo  Prior to Session Communication: Bedside nurse  Patient Position  Received: Bed, 3 rail up, Alarm off, not on at start of session  General Comment: Pt supine in bed upon entry to room. Pt pleasant, cooperative and willing to work with PT.  Home Living:  Home Living  Type of Home: House  Lives With: Spouse (dog and cat)  Home Adaptive Equipment: Cane  Home Layout: One level  Home Access: Stairs to enter with rails  Entrance Stairs-Rails:  (1)  Entrance Stairs-Number of Steps: 6  Bathroom Shower/Tub: Tub/shower unit  Prior Level of Function:  Prior Function Per Pt/Caregiver Report  Level of [place]: Independent with ADLs and functional transfers, Independent with homemaking with ambulation  ADL Assistance: Independent  Homemaking Assistance: Independent  Ambulatory Assistance: Independent  Prior Function Comments: pt denies any falls over the last 6 months  Precautions:  Precautions  Medical Precautions: Fall precautions  Post-Surgical Precautions: Abdominal surgery precautions     Date/Time Vitals Session Patient Position Pulse Resp SpO2 BP MAP (mmHg)     --  --      --     01/22/25 1534 Pre PT  Lying  79  --  98 %  101/69  --     01/22/25 1535 During PT  Sitting  80  --  --  96/64  --     01/22/25 1536 Post PT  Lying  77  --  --  114/72  --           Objective   Pain:  Pain Assessment  Pain Assessment: 0-10  0-10 (Numeric) Pain Score:  (unrated abdominal pain)  Cognition:  Cognition  Overall Cognitive Status: Within Functional Limits  Orientation Level: Oriented X4    General Assessments:  Activity Tolerance  Endurance: Decreased tolerance for upright activites    Sensation  Light Touch: No apparent deficits    Static Sitting Balance  Static Sitting-Level of Assistance: Close supervision  Dynamic Sitting Balance  Dynamic Sitting-Level of Assistance: Close supervision    Static Standing Balance  Static Standing-Level of Assistance: Close supervision  Dynamic Standing Balance  Dynamic Standing-Level of Assistance: Contact guard  Functional Assessments:  Bed Mobility  Bed  Mobility: Yes  Bed Mobility 1  Bed Mobility 1: Supine to sitting, Sitting to supine  Level of Assistance 1: Contact guard, Minimal verbal cues, Minimal tactile cues  Bed Mobility Comments 1: HOB partially elevated, verbal cuing for log roll    Transfers  Transfer: Yes  Transfer 1  Technique 1: Sit to stand, Stand to sit  Transfer Device 1: Walker  Transfer Level of Assistance 1: Contact guard, Minimal verbal cues, Minimal tactile cues  Trials/Comments 1: verbal cuing for hand placement, pt with increased time to complete    Ambulation/Gait Training  Ambulation/Gait Training Performed: Yes  Ambulation/Gait Training 1  Surface 1: Level tile  Device 1: Rolling walker  Assistance 1: Contact guard, Minimal verbal cues, Minimal tactile cues  Quality of Gait 1:  (decreased ilda and step length)  Comments/Distance (ft) 1: 15ft  Extremity/Trunk Assessments:  RLE   RLE : Within Functional Limits  LLE   LLE : Within Functional Limits  Treatments:  Therapeutic Activity  Therapeutic Activity Performed: Yes  Therapeutic Activity 1: pt completing additional 2 STS from toilet with fww and close sup. Pt performing standing dynamic balance at sink with no UE for ~30 seconds while completing hygiene. Pt ambulating additional 35ft with fww and close supervision in room. skillful monitoring of vital signs throughout session. Additional time spent during session overall on functional mobility, with pt needing increased time to complete 2/2 to anxiety. Pt educated on abdominal precautions and pursed lip breathing throughout session  Outcome Measures:  [facility] Basic Mobility  Turning from your back to your side while in a flat bed without using bedrails: A little  Moving from lying on your back to sitting on the side of a flat bed without using bedrails: A little  Moving to and from bed to chair (including a wheelchair): A little  Standing up from a chair using your arms (e.g. wheelchair or bedside chair): A little  To walk in hospital  room: A little  Climbing 3-5 steps with railing: A little  Basic Mobility - Total Score: 18    Encounter Problems       Encounter Problems (Active)       Mobility       STG - Patient will ascend and descend >6 steps with 1 HR and supervision.        Start:  01/22/25    Expected End:  02/05/25            Pt will perform bed mobility independently.        Start:  01/22/25    Expected End:  02/05/25            Pt will ambulate >250ft with LRAD and supervision Assist with no LOB and VSS.         Start:  01/22/25    Expected End:  02/05/25               PT Transfers       Pt will perform all functional transfers with LRAD and supervision assist.         Start:  01/22/25    Expected End:  02/05/25               Pain - Adult              Education Documentation  Precautions, taught by Emily Phan PT at 1/22/2025  4:54 PM.  Learner: Patient  Readiness: Acceptance  Method: Explanation  Response: Verbalizes Understanding, Needs Reinforcement  Comment: abdominal precautions    Mobility Training, taught by Emily Phan, PT at 1/22/2025  4:54 PM.  Learner: Patient  Readiness: Acceptance  Method: Explanation  Response: Verbalizes Understanding, Needs Reinforcement  Comment: abdominal precautions    Education Comments  No comments found.    Emily Phan, PT

## 2025-01-22 NOTE — CARE PLAN
Problem: Pain  Goal: Takes deep breaths with improved pain control throughout the shift  Outcome: Progressing  Goal: Turns in bed with improved pain control throughout the shift  Outcome: Progressing  Goal: Walks with improved pain control throughout the shift  Outcome: Progressing  Goal: Performs ADL's with improved pain control throughout shift  Outcome: Progressing  Goal: Participates in PT with improved pain control throughout the shift  Outcome: Progressing  Goal: Free from opioid side effects throughout the shift  Outcome: Progressing  Goal: Free from acute confusion related to pain meds throughout the shift  Outcome: Progressing     Problem: Pain - Adult  Goal: Verbalizes/displays adequate comfort level or baseline comfort level  Outcome: Progressing     Problem: Safety - Adult  Goal: Free from fall injury  Outcome: Progressing     Problem: Discharge Planning  Goal: Discharge to home or other facility with appropriate resources  Outcome: Progressing     Problem: Chronic Conditions and Co-morbidities  Goal: Patient's chronic conditions and co-morbidity symptoms are monitored and maintained or improved  Outcome: Progressing     Problem: Fall/Injury  Goal: Not fall by end of shift  Outcome: Progressing  Goal: Be free from injury by end of the shift  Outcome: Progressing  Goal: Verbalize understanding of personal risk factors for fall in the hospital  Outcome: Progressing  Goal: Verbalize understanding of risk factor reduction measures to prevent injury from fall in the home  Outcome: Progressing  Goal: Use assistive devices by end of the shift  Outcome: Progressing  Goal: Pace activities to prevent fatigue by end of the shift  Outcome: Progressing   The patient's goals for the shift include Rest    The clinical goals for the shift include Pain management

## 2025-01-22 NOTE — CARE PLAN
The clinical goals for the shift include Patient will manage pain < 4/10 throughout the shift    Problem: Pain  Goal: Takes deep breaths with improved pain control throughout the shift  Outcome: Progressing  Goal: Turns in bed with improved pain control throughout the shift  Outcome: Progressing  Goal: Walks with improved pain control throughout the shift  Outcome: Progressing  Goal: Performs ADL's with improved pain control throughout shift  Outcome: Progressing  Goal: Participates in PT with improved pain control throughout the shift  Outcome: Progressing  Goal: Free from opioid side effects throughout the shift  Outcome: Progressing  Goal: Free from acute confusion related to pain meds throughout the shift  Outcome: Progressing     Problem: Pain - Adult  Goal: Verbalizes/displays adequate comfort level or baseline comfort level  Outcome: Progressing     Problem: Safety - Adult  Goal: Free from fall injury  Outcome: Progressing     Problem: Discharge Planning  Goal: Discharge to home or other facility with appropriate resources  Outcome: Progressing     Problem: Chronic Conditions and Co-morbidities  Goal: Patient's chronic conditions and co-morbidity symptoms are monitored and maintained or improved  Outcome: Progressing     Problem: Fall/Injury  Goal: Not fall by end of shift  Outcome: Progressing  Goal: Be free from injury by end of the shift  Outcome: Progressing  Goal: Verbalize understanding of personal risk factors for fall in the hospital  Outcome: Progressing  Goal: Verbalize understanding of risk factor reduction measures to prevent injury from fall in the home  Outcome: Progressing  Goal: Use assistive devices by end of the shift  Outcome: Progressing  Goal: Pace activities to prevent fatigue by end of the shift  Outcome: Progressing

## 2025-01-23 ENCOUNTER — HOME HEALTH ADMISSION (OUTPATIENT)
Dept: HOME HEALTH SERVICES | Facility: HOME HEALTH | Age: 65
End: 2025-01-23
Payer: MEDICARE

## 2025-01-23 LAB
ANION GAP SERPL CALC-SCNC: 10 MMOL/L (ref 10–20)
BUN SERPL-MCNC: 8 MG/DL (ref 6–23)
CALCIUM SERPL-MCNC: 10.2 MG/DL (ref 8.6–10.6)
CHLORIDE SERPL-SCNC: 108 MMOL/L (ref 98–107)
CO2 SERPL-SCNC: 26 MMOL/L (ref 21–32)
CREAT SERPL-MCNC: 0.8 MG/DL (ref 0.5–1.05)
EGFRCR SERPLBLD CKD-EPI 2021: 82 ML/MIN/1.73M*2
ERYTHROCYTE [DISTWIDTH] IN BLOOD BY AUTOMATED COUNT: 13.2 % (ref 11.5–14.5)
GLUCOSE SERPL-MCNC: 99 MG/DL (ref 74–99)
HCT VFR BLD AUTO: 38.6 % (ref 36–46)
HGB BLD-MCNC: 12 G/DL (ref 12–16)
MAGNESIUM SERPL-MCNC: 2.26 MG/DL (ref 1.6–2.4)
MCH RBC QN AUTO: 27.9 PG (ref 26–34)
MCHC RBC AUTO-ENTMCNC: 31.1 G/DL (ref 32–36)
MCV RBC AUTO: 90 FL (ref 80–100)
NRBC BLD-RTO: 0 /100 WBCS (ref 0–0)
PLATELET # BLD AUTO: 447 X10*3/UL (ref 150–450)
POTASSIUM SERPL-SCNC: 4 MMOL/L (ref 3.5–5.3)
RBC # BLD AUTO: 4.3 X10*6/UL (ref 4–5.2)
SODIUM SERPL-SCNC: 140 MMOL/L (ref 136–145)
WBC # BLD AUTO: 7.3 X10*3/UL (ref 4.4–11.3)

## 2025-01-23 PROCEDURE — 2500000004 HC RX 250 GENERAL PHARMACY W/ HCPCS (ALT 636 FOR OP/ED): Performed by: NURSE PRACTITIONER

## 2025-01-23 PROCEDURE — 2500000001 HC RX 250 WO HCPCS SELF ADMINISTERED DRUGS (ALT 637 FOR MEDICARE OP)

## 2025-01-23 PROCEDURE — 83735 ASSAY OF MAGNESIUM: CPT

## 2025-01-23 PROCEDURE — 80048 BASIC METABOLIC PNL TOTAL CA: CPT

## 2025-01-23 PROCEDURE — 97530 THERAPEUTIC ACTIVITIES: CPT | Mod: GP,CQ

## 2025-01-23 PROCEDURE — 97116 GAIT TRAINING THERAPY: CPT | Mod: GP,CQ

## 2025-01-23 PROCEDURE — 85027 COMPLETE CBC AUTOMATED: CPT

## 2025-01-23 PROCEDURE — 2500000004 HC RX 250 GENERAL PHARMACY W/ HCPCS (ALT 636 FOR OP/ED)

## 2025-01-23 PROCEDURE — 1170000001 HC PRIVATE ONCOLOGY ROOM DAILY

## 2025-01-23 PROCEDURE — 36415 COLL VENOUS BLD VENIPUNCTURE: CPT

## 2025-01-23 RX ORDER — IBUPROFEN 400 MG/1
400 TABLET ORAL EVERY 6 HOURS SCHEDULED
Status: DISCONTINUED | OUTPATIENT
Start: 2025-01-23 | End: 2025-01-24 | Stop reason: HOSPADM

## 2025-01-23 RX ORDER — SIMETHICONE 125 MG
125 CAPSULE ORAL 3 TIMES DAILY
Status: DISCONTINUED | OUTPATIENT
Start: 2025-01-23 | End: 2025-01-24 | Stop reason: HOSPADM

## 2025-01-23 RX ADMIN — POLYETHYLENE GLYCOL 3350 17 G: 17 POWDER, FOR SOLUTION ORAL at 08:03

## 2025-01-23 RX ADMIN — ACETAMINOPHEN 1000 MG: 160 SOLUTION ORAL at 02:12

## 2025-01-23 RX ADMIN — PHENAZOPYRIDINE 100 MG: 100 TABLET ORAL at 18:28

## 2025-01-23 RX ADMIN — ACETAMINOPHEN 1000 MG: 160 SOLUTION ORAL at 15:09

## 2025-01-23 RX ADMIN — ENOXAPARIN SODIUM 40 MG: 100 INJECTION SUBCUTANEOUS at 08:03

## 2025-01-23 RX ADMIN — ACETAMINOPHEN 1000 MG: 160 SOLUTION ORAL at 20:12

## 2025-01-23 RX ADMIN — ACETAMINOPHEN 1000 MG: 160 SOLUTION ORAL at 08:03

## 2025-01-23 RX ADMIN — IBUPROFEN 400 MG: 400 TABLET ORAL at 15:03

## 2025-01-23 RX ADMIN — PHENAZOPYRIDINE 100 MG: 100 TABLET ORAL at 08:03

## 2025-01-23 RX ADMIN — IBUPROFEN 400 MG: 400 TABLET ORAL at 08:02

## 2025-01-23 RX ADMIN — IBUPROFEN 400 MG: 400 TABLET ORAL at 20:13

## 2025-01-23 ASSESSMENT — COGNITIVE AND FUNCTIONAL STATUS - GENERAL
MOVING FROM LYING ON BACK TO SITTING ON SIDE OF FLAT BED WITH BEDRAILS: A LITTLE
TURNING FROM BACK TO SIDE WHILE IN FLAT BAD: A LITTLE
PERSONAL GROOMING: A LITTLE
DAILY ACTIVITIY SCORE: 19
DRESSING REGULAR UPPER BODY CLOTHING: A LITTLE
DAILY ACTIVITIY SCORE: 24
TOILETING: A LITTLE
HELP NEEDED FOR BATHING: A LITTLE
CLIMB 3 TO 5 STEPS WITH RAILING: A LITTLE
STANDING UP FROM CHAIR USING ARMS: A LITTLE
MOVING TO AND FROM BED TO CHAIR: A LITTLE
MOBILITY SCORE: 18
MOBILITY SCORE: 24
MOVING TO AND FROM BED TO CHAIR: A LITTLE
DRESSING REGULAR LOWER BODY CLOTHING: A LITTLE
MOVING FROM LYING ON BACK TO SITTING ON SIDE OF FLAT BED WITH BEDRAILS: A LITTLE
WALKING IN HOSPITAL ROOM: A LITTLE
MOBILITY SCORE: 18
CLIMB 3 TO 5 STEPS WITH RAILING: A LITTLE
STANDING UP FROM CHAIR USING ARMS: A LITTLE
WALKING IN HOSPITAL ROOM: A LITTLE
TURNING FROM BACK TO SIDE WHILE IN FLAT BAD: A LITTLE

## 2025-01-23 ASSESSMENT — PAIN - FUNCTIONAL ASSESSMENT
PAIN_FUNCTIONAL_ASSESSMENT: 0-10
PAIN_FUNCTIONAL_ASSESSMENT: 0-10

## 2025-01-23 ASSESSMENT — PAIN SCALES - GENERAL
PAINLEVEL_OUTOF10: 2
PAINLEVEL_OUTOF10: 2
PAINLEVEL_OUTOF10: 7
PAINLEVEL_OUTOF10: 6
PAINLEVEL_OUTOF10: 0 - NO PAIN
PAINLEVEL_OUTOF10: 4
PAINLEVEL_OUTOF10: 3

## 2025-01-23 ASSESSMENT — PAIN DESCRIPTION - LOCATION: LOCATION: ABDOMEN

## 2025-01-23 ASSESSMENT — PAIN DESCRIPTION - ORIENTATION: ORIENTATION: MID

## 2025-01-23 NOTE — PROGRESS NOTES
01/23/25 1500   Discharge Planning   Living Arrangements Spouse/significant other   Support Systems Spouse/significant other   Assistance Needed none   Type of Residence Private residence   Who is requesting discharge planning? Provider   Home or Post Acute Services In home services   Type of Home Care Services Home PT   Expected Discharge Disposition Home H     Per medical team, patient will discharge home with [facility]-PT. ADOD 1/24/25. PT recommends [facility]. [facility] able to supply. [facility] delivered bedside by [facility]. Mayela Reagan RN [facility]

## 2025-01-23 NOTE — CARE PLAN
The patient's goals for the shift include decrease pain     The clinical goals for the shift include patient will remain HDS  Over the shift, the patient did not make progress toward the following goals.   Problem: Pain  Goal: Takes deep breaths with improved pain control throughout the shift  Outcome: Progressing  Goal: Turns in bed with improved pain control throughout the shift  Outcome: Progressing  Goal: Walks with improved pain control throughout the shift  Outcome: Progressing  Goal: Performs ADL's with improved pain control throughout shift  Outcome: Progressing  Goal: Participates in PT with improved pain control throughout the shift  Outcome: Progressing  Goal: Free from opioid side effects throughout the shift  Outcome: Progressing  Goal: Free from acute confusion related to pain meds throughout the shift  Outcome: Progressing     Problem: Pain - Adult  Goal: Verbalizes/displays adequate comfort level or baseline comfort level  Outcome: Progressing     Problem: Discharge Planning  Goal: Discharge to home or other facility with appropriate resources  Outcome: Progressing     Problem: Safety - Adult  Goal: Free from fall injury  Outcome: Progressing

## 2025-01-23 NOTE — CARE PLAN
Problem: Pain  Goal: Takes deep breaths with improved pain control throughout the shift  Outcome: Progressing  Goal: Turns in bed with improved pain control throughout the shift  Outcome: Progressing  Goal: Walks with improved pain control throughout the shift  Outcome: Progressing  Goal: Performs ADL's with improved pain control throughout shift  Outcome: Progressing  Goal: Participates in PT with improved pain control throughout the shift  Outcome: Progressing  Goal: Free from opioid side effects throughout the shift  Outcome: Progressing  Goal: Free from acute confusion related to pain meds throughout the shift  Outcome: Progressing     Problem: Pain - Adult  Goal: Verbalizes/displays adequate comfort level or baseline comfort level  Outcome: Progressing     Problem: Safety - Adult  Goal: Free from fall injury  Outcome: Progressing     Problem: Discharge Planning  Goal: Discharge to home or other facility with appropriate resources  Outcome: Progressing

## 2025-01-23 NOTE — PROGRESS NOTES
Physical Therapy    Physical Therapy Treatment    Patient Name: Lynette Garcia  MRN: [id number]  Department: [facility]  Room: [facility]6022-A  Today's Date: 1/23/2025  Time Calculation  Start Time: 1347  Stop Time: 1417  Time Calculation (min): 30 min         Assessment/Plan   PT Assessment  Barriers to Discharge Home: No anticipated barriers  End of Session Communication: Bedside nurse  Assessment Comment: Pt tolerated PT session well, able to tolerated ambulation in hallway despite increased abdominal pain. Increased time spent for abdominal precautions. Pt continues to remain appropriate for Low intensity PT upon D/C from hospital.  End of Session Patient Position: Up in chair, Alarm off, not on at start of session  PT Plan  Inpatient/Swing Bed or Outpatient: Inpatient  PT Plan  Treatment/Interventions: Bed mobility, Transfer training, Gait training, Stair training, Balance training, Neuromuscular re-education, Strengthening, Endurance training, Therapeutic exercise, Therapeutic activity, Home exercise program, Positioning, Postural re-education  PT Plan: Ongoing PT  PT Frequency: 4 times per week  PT Discharge Recommendations: Low intensity level of continued care  Equipment Recommended upon Discharge: Wheeled walker  PT Recommended Transfer Status: Contact guard, Assistive device  PT - OK to Discharge: Yes (PT eval complete and DC rec made)      General Visit Information:   PT  Visit  PT Received On: 01/23/25  General  Missed Visit Reason:  (n/a)  Family/Caregiver Present: Yes  Caregiver Feedback:  present  Prior to Session Communication: Bedside nurse  Patient Position Received: Up in chair, Alarm off, not on at start of session  General Comment: Pt supine in bed, agreeable to work with PT despite increased abdominal pain.    Subjective   Precautions:  Precautions  Medical Precautions: Fall precautions  Post-Surgical Precautions: Abdominal surgery precautions  Precautions Comment: Abdominal precautions reviewed  at beginning of session.    Objective   Pain:  Pain Assessment  Pain Assessment: 0-10  0-10 (Numeric) Pain Score: 7  Pain Type: Surgical pain  Pain Location: Abdomen  Cognition:  Cognition  Overall Cognitive Status: Within Functional Limits  Orientation Level: Oriented X4    Activity Tolerance:  Activity Tolerance  Endurance: Tolerates 10 - 20 min exercise with multiple rests (Limited by pain this date)  Treatments:  Therapeutic Activity  Therapeutic Activity Performed: Yes  Therapeutic Activity 1: Increased time spent educating pt and spouse on abdominal precautions and log roll for bed mobility. Pt and spouse both receptive to education.    Bed Mobility  Bed Mobility: No    Ambulation/Gait Training  Ambulation/Gait Training Performed: Yes  Ambulation/Gait Training 1  Surface 1: Level tile  Device 1: Rolling walker  Assistance 1: Contact guard, Close supervision, Minimal verbal cues  Quality of Gait 1: Narrow base of support, Decreased step length (Decreased ilda,)  Comments/Distance (ft) 1: 60ft x2, standing rest break d/t pain >fatigue. Cues for posture and management of FWW, pt initially tendency to  FWW vs pushing.    Transfers  Transfer: Yes  Transfer 1  Transfer From 1: Chair with arms to  Transfer to 1: Stand  Technique 1: Sit to stand, Stand to sit  Transfer Device 1: Walker  Transfer Level of Assistance 1: Contact guard, Minimal verbal cues  Trials/Comments 1: x2 trials, cues for safe hand placement.    Stairs  Stairs: No    Outcome Measures:  [facility] Basic Mobility  Turning from your back to your side while in a flat bed without using bedrails: A little  Moving from lying on your back to sitting on the side of a flat bed without using bedrails: A little  Moving to and from bed to chair (including a wheelchair): A little  Standing up from a chair using your arms (e.g. wheelchair or bedside chair): A little  To walk in hospital room: A little  Climbing 3-5 steps with railing: A little  Basic  Mobility - Total Score: 18    Education Documentation  Precautions, taught by Rebeca Metz PTA at 1/23/2025  2:59 PM.  Learner: Significant Other, Patient  Readiness: Acceptance  Method: Explanation  Response: Verbalizes Understanding  Comment: Abdominal precautions, log roll for bed mobility,    Mobility Training, taught by Rebeca Metz PTA at 1/23/2025  2:59 PM.  Learner: Significant Other, Patient  Readiness: Acceptance  Method: Explanation  Response: Verbalizes Understanding  Comment: Abdominal precautions, log roll for bed mobility,    Education Comments  No comments found.        OP EDUCATION:       Encounter Problems       Encounter Problems (Active)       Mobility       STG - Patient will ascend and descend >6 steps with 1 HR and supervision.  (Progressing)       Start:  01/22/25    Expected End:  02/05/25            Pt will perform bed mobility independently.  (Progressing)       Start:  01/22/25    Expected End:  02/05/25            Pt will ambulate >250ft with LRAD and supervision Assist with no LOB and VSS.   (Progressing)       Start:  01/22/25    Expected End:  02/05/25               PT Transfers       Pt will perform all functional transfers with LRAD and supervision assist.   (Progressing)       Start:  01/22/25    Expected End:  02/05/25               Pain - Adult            01/23/25 at 3:03 PM   Rebeca Metz PTA   Rehab Office: [phone]

## 2025-01-23 NOTE — PROGRESS NOTES
"Lynette Garcia is a 64 y.o. female on day 2 of admission after LAURA, BSO, removal of pelvic mass.     Subjective   Has had a lot of gas pain overnight. Simethicone relieves pain. Burping a lot. Not yet passing flatus. No emesis overnight. Tolerated small amount of broth and mashed potatoes. Voiding independently and ambulating independently.       Objective   Last Recorded Vitals  Blood pressure 110/72, pulse 75, temperature 36.3 °C (97.3 °F), temperature source Temporal, resp. rate 16, height 1.6 m (5' 3\"), weight 70.2 kg (154 lb 12.2 oz), SpO2 97%.  Intake/Output last 3 Shifts:    Intake/Output Summary (Last 24 hours) at 1/23/2025 0554  Last data filed at 1/23/2025 0400  Gross per 24 hour   Intake 240 ml   Output 1550 ml   Net -1310 ml     Physical Exam   General: no acute distress  HEENT: normocephalic, atraumatic  Heart: normal rate, regular rhythm, no murmurs appreciated  Lungs: CTAB, breathing comfortably on room air  Abdomen: soft, nondistended, mildly tender to palpation, no rebound/guarding. Midline vertical incision well approximated and covered with steri-strips with minimal shadowing, dry  : nieves in place draining clear orange urine  Extremities: moving all extremities  Neuro: awake and conversant  Psych: appropriate mood and affect    Assessment/Plan   64 y.o. now post op s/p LAURA, BSO, and removal of pelvic mass on 1/21.     Postoperative state  Pain currently well controlled, current regimen: Tylenol & Motrin q6hr, oxycodone prn, dilaudid prn.   IS q1 hour while awake, at bedside, continue to encourage   Taking PO. Antiemetics as needed  Pyridium prn for bladder spasms.  Encourage ambulation. PT recs home care.  Lovenox ppx/SCDs for DVT ppx     Comorbidities  Vertigo - no medications currently    To be Discussed with Dr. Wally Cervantes MD, PGY-2  [facility] Pager [phone]    "

## 2025-01-23 NOTE — NURSING NOTE
Patient is taking her own medication of 125 mg simethicone without telling anyone. She states she is allowed to do it. Nurse attempted to take them from bedside but patient refused. Resident was present during this exchange and stated it was alright for the simethicone to be bedside. Patient re educated on the dangers of taking medication that was not prescribed here. New order placed for pharmacy to approve patients own medication from home.

## 2025-01-24 ENCOUNTER — DOCUMENTATION (OUTPATIENT)
Dept: HOME HEALTH SERVICES | Facility: HOME HEALTH | Age: 65
End: 2025-01-24
Payer: MEDICARE

## 2025-01-24 ENCOUNTER — PHARMACY VISIT (OUTPATIENT)
Dept: PHARMACY | Facility: CLINIC | Age: 65
End: 2025-01-24
Payer: COMMERCIAL

## 2025-01-24 VITALS
BODY MASS INDEX: 27.42 KG/M2 | HEIGHT: 63 IN | DIASTOLIC BLOOD PRESSURE: 86 MMHG | HEART RATE: 82 BPM | TEMPERATURE: 97.9 F | RESPIRATION RATE: 16 BRPM | OXYGEN SATURATION: 100 % | SYSTOLIC BLOOD PRESSURE: 128 MMHG | WEIGHT: 154.76 LBS

## 2025-01-24 LAB
LABORATORY COMMENT REPORT: NORMAL
LABORATORY COMMENT REPORT: NORMAL
PATH REPORT.FINAL DX SPEC: NORMAL
PATH REPORT.GROSS SPEC: NORMAL
PATH REPORT.RELEVANT HX SPEC: NORMAL
PATH REPORT.TOTAL CANCER: NORMAL
RESIDENT REVIEW: NORMAL

## 2025-01-24 PROCEDURE — 2500000004 HC RX 250 GENERAL PHARMACY W/ HCPCS (ALT 636 FOR OP/ED): Performed by: NURSE PRACTITIONER

## 2025-01-24 PROCEDURE — 2500000001 HC RX 250 WO HCPCS SELF ADMINISTERED DRUGS (ALT 637 FOR MEDICARE OP)

## 2025-01-24 RX ADMIN — ACETAMINOPHEN 1000 MG: 160 SOLUTION ORAL at 04:46

## 2025-01-24 RX ADMIN — ACETAMINOPHEN 1000 MG: 160 SOLUTION ORAL at 13:06

## 2025-01-24 RX ADMIN — IBUPROFEN 400 MG: 400 TABLET ORAL at 13:06

## 2025-01-24 RX ADMIN — IBUPROFEN 400 MG: 400 TABLET ORAL at 04:45

## 2025-01-24 RX ADMIN — ENOXAPARIN SODIUM 40 MG: 100 INJECTION SUBCUTANEOUS at 09:08

## 2025-01-24 ASSESSMENT — COGNITIVE AND FUNCTIONAL STATUS - GENERAL
MOBILITY SCORE: 18
STANDING UP FROM CHAIR USING ARMS: A LITTLE
EATING MEALS: A LOT
DAILY ACTIVITIY SCORE: 14
TURNING FROM BACK TO SIDE WHILE IN FLAT BAD: A LITTLE
MOVING TO AND FROM BED TO CHAIR: A LITTLE
HELP NEEDED FOR BATHING: A LITTLE
MOVING FROM LYING ON BACK TO SITTING ON SIDE OF FLAT BED WITH BEDRAILS: A LITTLE
DRESSING REGULAR UPPER BODY CLOTHING: A LOT
DRESSING REGULAR LOWER BODY CLOTHING: A LITTLE
WALKING IN HOSPITAL ROOM: A LITTLE
CLIMB 3 TO 5 STEPS WITH RAILING: A LITTLE
TOILETING: A LOT
PERSONAL GROOMING: A LOT

## 2025-01-24 ASSESSMENT — PAIN SCALES - GENERAL
PAINLEVEL_OUTOF10: 0 - NO PAIN
PAINLEVEL_OUTOF10: 4
PAINLEVEL_OUTOF10: 0 - NO PAIN

## 2025-01-24 ASSESSMENT — PAIN - FUNCTIONAL ASSESSMENT: PAIN_FUNCTIONAL_ASSESSMENT: 0-10

## 2025-01-24 NOTE — HH CARE COORDINATION
Home Care received a Referral for Physical Therapy. We have processed the referral for a Start of Care on 1/25-1/26.     If you have any questions or concerns, please feel free to contact us at [phone]. Follow the prompts, enter your five digit zip code, and you will be directed to your care team on [facility] 2.

## 2025-01-24 NOTE — NURSING NOTE
Reviewed AVS with patient. Patient verbalized understanding of discharge instructions and stated no further questions. Ivs were removed. Patient declined to take prescribed medications home besides Miralax. All other medications were discarded. Patient belongings sent with patient. Patient was escorted by transport via wheelchair to [facility].

## 2025-01-24 NOTE — CARE PLAN
Problem: Pain  Goal: Takes deep breaths with improved pain control throughout the shift  1/23/2025 2345 by Reggie Rosario RN  Outcome: Progressing  1/23/2025 2345 by Reggie Rosario RN  Outcome: Progressing  1/23/2025 2345 by Reggie Rosario RN  Outcome: Progressing  Goal: Turns in bed with improved pain control throughout the shift  1/23/2025 2345 by Reggie Rosario RN  Outcome: Progressing  1/23/2025 2345 by Reggie Rosario RN  Outcome: Progressing  1/23/2025 2345 by Reggie Rosario RN  Outcome: Progressing  Goal: Walks with improved pain control throughout the shift  1/23/2025 2345 by Reggie Rosario RN  Outcome: Progressing  1/23/2025 2345 by Reggie Rosario RN  Outcome: Progressing  1/23/2025 2345 by Reggie Rosario RN  Outcome: Progressing  Goal: Performs ADL's with improved pain control throughout shift  1/23/2025 2345 by Reggie Rosario RN  Outcome: Progressing  1/23/2025 2345 by Reggie Rosario RN  Outcome: Progressing  1/23/2025 2345 by Reggie Rosario RN  Outcome: Progressing  Goal: Participates in PT with improved pain control throughout the shift  1/23/2025 2345 by Reggie Rosario RN  Outcome: Progressing  1/23/2025 2345 by Reggie Rosario RN  Outcome: Progressing  1/23/2025 2345 by Reggie Rosario RN  Outcome: Progressing  Goal: Free from opioid side effects throughout the shift  1/23/2025 2345 by Reggie Rosario RN  Outcome: Progressing  1/23/2025 2345 by Reggie Rosario RN  Outcome: Progressing  1/23/2025 2345 by Reggie Rosario RN  Outcome: Progressing  Goal: Free from acute confusion related to pain meds throughout the shift  1/23/2025 2345 by Reggie Rosario RN  Outcome: Progressing  1/23/2025 2345 by Reggie Rosario RN  Outcome: Progressing  1/23/2025 2345 by Reggie Rosario RN  Outcome: Progressing     Problem: Pain - Adult  Goal: Verbalizes/displays adequate comfort level or baseline comfort level  1/23/2025 2345 by Reggie Rosario, RN  Outcome: Progressing  1/23/2025 2345 by Reggie Rosario, RN  Outcome:  Progressing  1/23/2025 2345 by Reggie Rosario RN  Outcome: Progressing     Problem: Safety - Adult  Goal: Free from fall injury  1/23/2025 2345 by Reggie Rosario RN  Outcome: Progressing  1/23/2025 2345 by Reggie Rosario RN  Outcome: Progressing  1/23/2025 2345 by Reggie Rosario RN  Outcome: Progressing     Problem: Discharge Planning  Goal: Discharge to home or other facility with appropriate resources  1/23/2025 2345 by Reggie Rosario RN  Outcome: Progressing  1/23/2025 2345 by Reggie Rosario RN  Outcome: Progressing  1/23/2025 2345 by Reggie Rosario RN  Outcome: Progressing     Problem: Chronic Conditions and Co-morbidities  Goal: Patient's chronic conditions and co-morbidity symptoms are monitored and maintained or improved  1/23/2025 2345 by Reggie Rosario RN  Outcome: Progressing  1/23/2025 2345 by Reggie Rosario RN  Outcome: Progressing  1/23/2025 2345 by Reggie Rosario RN  Outcome: Progressing     Problem: Fall/Injury  Goal: Not fall by end of shift  1/23/2025 2345 by Reggie Rosario RN  Outcome: Progressing  1/23/2025 2345 by Reggie Rosario RN  Outcome: Progressing  1/23/2025 2345 by Reggie Rosario RN  Outcome: Progressing  Goal: Be free from injury by end of the shift  1/23/2025 2345 by Reggie Rosario RN  Outcome: Progressing  1/23/2025 2345 by Reggie Rosario RN  Outcome: Progressing  1/23/2025 2345 by Reggie Rosario RN  Outcome: Progressing  Goal: Verbalize understanding of personal risk factors for fall in the hospital  1/23/2025 2345 by Reggie Rosario RN  Outcome: Progressing  1/23/2025 2345 by Reggie Rosario RN  Outcome: Progressing  1/23/2025 2345 by Reggie Rosario RN  Outcome: Progressing  Goal: Verbalize understanding of risk factor reduction measures to prevent injury from fall in the home  1/23/2025 2345 by Reggie Rosario RN  Outcome: Progressing  1/23/2025 2345 by Dianela Rosario, RN  Outcome: Progressing  1/23/2025 2345 by Reggie Rosario, RN  Outcome: Progressing  Goal: Use assistive devices by end  of the shift  1/23/2025 2345 by Reggie Rosario RN  Outcome: Progressing  1/23/2025 2345 by Reggie Rosario RN  Outcome: Progressing  1/23/2025 2345 by Reggie Rosario RN  Outcome: Progressing  Goal: Pace activities to prevent fatigue by end of the shift  1/23/2025 2345 by Reggie Rosario RN  Outcome: Progressing  1/23/2025 2345 by Reggie Rosario RN  Outcome: Progressing  1/23/2025 2345 by Reggie Rosario RN  Outcome: Progressing   The patient's goals for the shift include Rest    The clinical goals for the shift include patient will remain HDS

## 2025-01-24 NOTE — DISCHARGE SUMMARY
Discharge Summary    Admission Date: 1/21/2025  Discharge Date: 1/24/2025    Discharge Diagnosis  Pelvic mass    Hospital Course  Lynette Garcia is a 63 y/o with a large pelvic mass who presented for scheduled LAURA, BSO. Frozen path of the mass was benign; however, please refer to the pertinent op note for further surgical details. Her post-operative course was largely uncomplicated. The patient was meeting all expected goals including ambulating, voiding, passing flatus, having bowel movements, and tolerating a regular diet without n/v. Her pain was well controlled. Pt was discharged in stable condition on 1/24/25 with home PT and planned outpatient follow-up with Marizol Vargas NP on 2/18/25.    Pertinent Physical Exam At Time of Discharge  General: no acute distress, sitting up in chair at bedside   HEENT: normocephalic, atraumatic  Heart: warm and well perfused, RRR  Lungs: breathing comfortably on room air, CTAB in the anterior lung fields  Abdomen: non-distended, soft, normoactive bowel sounds, incision site with steri strips in place, clean, dry, intact  Extremities: moving all extremities  Neuro: awake and conversant  Psych: appropriate mood and affect     Last Vitals:  Temp Pulse Resp BP MAP Pulse Ox   36.7 °C (98.1 °F) 81 16 134/85 101 98 %     Discharge Meds     Your medication list        START taking these medications        Instructions Last Dose Given Next Dose Due   ibuprofen 600 mg tablet      Take 1 tablet (600 mg) by mouth every 6 hours if needed for mild pain (1 - 3).       oxyCODONE 5 mg immediate release tablet  Commonly known as: Roxicodone      Take 1 tablet (5 mg) by mouth every 6 hours if needed for severe pain (7 - 10).       phenazopyridine 100 mg tablet  Commonly known as: Pyridium      Take 1 tablet (100 mg) by mouth 3 times a day as needed for bladder spasms.       polyethylene glycol 17 gram/dose powder  Commonly known as: Glycolax, Miralax      Mix 17 grams of powder in 8 oz water  and drink once a day until you have your first bowel movement for 10 days.              CHANGE how you take these medications        Instructions Last Dose Given Next Dose Due   acetaminophen 500 mg tablet  Commonly known as: Tylenol  What changed:   how much to take  reasons to take this      Take 2 tablets (1,000 mg) by mouth every 8 hours if needed for mild pain (1 - 3) or moderate pain (4 - 6).              CONTINUE taking these medications        Instructions Last Dose Given Next Dose Due   multivitamin capsule           ondansetron ODT 4 mg disintegrating tablet  Commonly known as: Zofran-ODT      Dissolve 1 tablet (4 mg) in the mouth every 8 hours if needed for nausea or vomiting.       simethicone 125 mg chewable tablet  Commonly known as: Mylicon                  STOP taking these medications      traMADol 50 mg tablet  Commonly known as: Ultram                  Where to Get Your Medications        These medications were sent to [facility]  [address], [address], [place] [address]      Hours: 8AM to 6PM Mon-Fri, 8AM to 4PM Sat, 9AM to 1PM Sun Phone: [phone]   acetaminophen 500 mg tablet  ibuprofen 600 mg tablet  ondansetron ODT 4 mg disintegrating tablet  oxyCODONE 5 mg immediate release tablet  phenazopyridine 100 mg tablet  polyethylene glycol 17 gram/dose powder          Complications Requiring Follow-Up  Gyn Onc: Post-op visit on 2/18/25 with Eladia Vargas NP     Test Results Pending At Discharge  Pending Labs       Order Current Status    Surgical Pathology Exam In process    Surgical Pathology Exam In process            Outpatient Follow-Up  Future Appointments   Date Time Provider Department Center   2/18/2025 10:00 AM Marizol Vargas, APRN-CNP [facility]       Jimmie Martini MD PGY-1   Gyn Onc, Pager [phone]

## 2025-01-24 NOTE — CARE PLAN
The patient's goals for the shift include Rest    The clinical goals for the shift include patient to remain hemodynamically stable      Problem: Pain  Goal: Takes deep breaths with improved pain control throughout the shift  Outcome: Progressing  Goal: Turns in bed with improved pain control throughout the shift  Outcome: Progressing  Goal: Walks with improved pain control throughout the shift  Outcome: Progressing  Goal: Performs ADL's with improved pain control throughout shift  Outcome: Progressing  Goal: Participates in PT with improved pain control throughout the shift  Outcome: Progressing  Goal: Free from opioid side effects throughout the shift  Outcome: Progressing  Goal: Free from acute confusion related to pain meds throughout the shift  Outcome: Progressing     Problem: Pain - Adult  Goal: Verbalizes/displays adequate comfort level or baseline comfort level  Outcome: Progressing     Problem: Safety - Adult  Goal: Free from fall injury  Outcome: Progressing     Problem: Discharge Planning  Goal: Discharge to home or other facility with appropriate resources  Outcome: Progressing     Problem: Chronic Conditions and Co-morbidities  Goal: Patient's chronic conditions and co-morbidity symptoms are monitored and maintained or improved  Outcome: Progressing     Problem: Fall/Injury  Goal: Not fall by end of shift  Outcome: Progressing  Goal: Be free from injury by end of the shift  Outcome: Progressing  Goal: Verbalize understanding of personal risk factors for fall in the hospital  Outcome: Progressing  Goal: Verbalize understanding of risk factor reduction measures to prevent injury from fall in the home  Outcome: Progressing  Goal: Use assistive devices by end of the shift  Outcome: Progressing  Goal: Pace activities to prevent fatigue by end of the shift  Outcome: Progressing

## 2025-01-24 NOTE — CARE PLAN
Problem: Pain  Goal: Takes deep breaths with improved pain control throughout the shift  Outcome: Progressing  Goal: Turns in bed with improved pain control throughout the shift  Outcome: Progressing  Goal: Walks with improved pain control throughout the shift  Outcome: Progressing  Goal: Performs ADL's with improved pain control throughout shift  Outcome: Progressing  Goal: Participates in PT with improved pain control throughout the shift  Outcome: Progressing  Goal: Free from opioid side effects throughout the shift  Outcome: Progressing  Goal: Free from acute confusion related to pain meds throughout the shift  Outcome: Progressing     Problem: Pain - Adult  Goal: Verbalizes/displays adequate comfort level or baseline comfort level  Outcome: Progressing     Problem: Safety - Adult  Goal: Free from fall injury  Outcome: Progressing     Problem: Discharge Planning  Goal: Discharge to home or other facility with appropriate resources  Outcome: Progressing     Problem: Chronic Conditions and Co-morbidities  Goal: Patient's chronic conditions and co-morbidity symptoms are monitored and maintained or improved  Outcome: Progressing     Problem: Fall/Injury  Goal: Not fall by end of shift  Outcome: Progressing  Goal: Be free from injury by end of the shift  Outcome: Progressing  Goal: Verbalize understanding of personal risk factors for fall in the hospital  Outcome: Progressing  Goal: Verbalize understanding of risk factor reduction measures to prevent injury from fall in the home  Outcome: Progressing  Goal: Use assistive devices by end of the shift  Outcome: Progressing  Goal: Pace activities to prevent fatigue by end of the shift  Outcome: Progressing   The patient's goals for the shift include Rest    The clinical goals for the shift include patient will remain HDS

## 2025-01-27 ENCOUNTER — APPOINTMENT (OUTPATIENT)
Dept: GYNECOLOGIC ONCOLOGY | Facility: HOSPITAL | Age: 65
End: 2025-01-27
Payer: MEDICARE

## 2025-01-28 ENCOUNTER — TELEPHONE (OUTPATIENT)
Dept: HOME HEALTH SERVICES | Facility: HOME HEALTH | Age: 65
End: 2025-01-28
Payer: MEDICARE

## 2025-01-28 LAB
LABORATORY COMMENT REPORT: NORMAL
LABORATORY COMMENT REPORT: NORMAL
Lab: NORMAL
Lab: NORMAL
PATH REPORT.FINAL DX SPEC: NORMAL
PATH REPORT.FINAL DX SPEC: NORMAL
PATH REPORT.GROSS SPEC: NORMAL
PATH REPORT.GROSS SPEC: NORMAL
PATH REPORT.RELEVANT HX SPEC: NORMAL
PATH REPORT.RELEVANT HX SPEC: NORMAL
PATH REPORT.TOTAL CANCER: NORMAL
PATH REPORT.TOTAL CANCER: NORMAL

## 2025-01-28 NOTE — TELEPHONE ENCOUNTER
Good afternoon Dr. Wilson  - I wanted to inform you that your patient has declined homecare services at this time. If patient changes their mind and would like homecare, a new referral can be sent in at that time. Please let me know if there are any questions or concerns. Thank you!

## 2025-02-07 ENCOUNTER — APPOINTMENT (OUTPATIENT)
Dept: GYNECOLOGIC ONCOLOGY | Facility: HOSPITAL | Age: 65
End: 2025-02-07
Payer: MEDICARE

## 2025-02-18 ENCOUNTER — APPOINTMENT (OUTPATIENT)
Dept: GYNECOLOGIC ONCOLOGY | Facility: CLINIC | Age: 65
End: 2025-02-18
Payer: MEDICARE

## 2025-02-25 ENCOUNTER — OFFICE VISIT (OUTPATIENT)
Dept: GYNECOLOGIC ONCOLOGY | Facility: CLINIC | Age: 65
End: 2025-02-25
Payer: MEDICARE

## 2025-02-25 ENCOUNTER — APPOINTMENT (OUTPATIENT)
Dept: HEMATOLOGY/ONCOLOGY | Facility: CLINIC | Age: 65
End: 2025-02-25
Payer: MEDICARE

## 2025-02-25 VITALS
DIASTOLIC BLOOD PRESSURE: 89 MMHG | HEIGHT: 63 IN | OXYGEN SATURATION: 100 % | BODY MASS INDEX: 27.29 KG/M2 | HEART RATE: 79 BPM | RESPIRATION RATE: 18 BRPM | TEMPERATURE: 96.1 F | WEIGHT: 153.99 LBS | SYSTOLIC BLOOD PRESSURE: 160 MMHG

## 2025-02-25 DIAGNOSIS — R30.0 BURNING WITH URINATION: Primary | ICD-10-CM

## 2025-02-25 LAB
APPEARANCE UR: CLEAR
BACTERIA #/AREA URNS AUTO: ABNORMAL /HPF
BILIRUB UR STRIP.AUTO-MCNC: NEGATIVE MG/DL
COLOR UR: COLORLESS
GLUCOSE UR STRIP.AUTO-MCNC: NORMAL MG/DL
HOLD SPECIMEN: NORMAL
KETONES UR STRIP.AUTO-MCNC: NEGATIVE MG/DL
LEUKOCYTE ESTERASE UR QL STRIP.AUTO: ABNORMAL
NITRITE UR QL STRIP.AUTO: NEGATIVE
PH UR STRIP.AUTO: 7 [PH]
PROT UR STRIP.AUTO-MCNC: NEGATIVE MG/DL
RBC # UR STRIP.AUTO: NEGATIVE MG/DL
RBC #/AREA URNS AUTO: ABNORMAL /HPF
SP GR UR STRIP.AUTO: 1.01
UROBILINOGEN UR STRIP.AUTO-MCNC: NORMAL MG/DL
WBC #/AREA URNS AUTO: ABNORMAL /HPF

## 2025-02-25 PROCEDURE — 1125F AMNT PAIN NOTED PAIN PRSNT: CPT | Performed by: NURSE PRACTITIONER

## 2025-02-25 PROCEDURE — 87086 URINE CULTURE/COLONY COUNT: CPT | Mod: GEALAB | Performed by: NURSE PRACTITIONER

## 2025-02-25 PROCEDURE — 1159F MED LIST DOCD IN RCRD: CPT | Performed by: NURSE PRACTITIONER

## 2025-02-25 PROCEDURE — 99211 OFF/OP EST MAY X REQ PHY/QHP: CPT | Performed by: NURSE PRACTITIONER

## 2025-02-25 PROCEDURE — 3008F BODY MASS INDEX DOCD: CPT | Performed by: NURSE PRACTITIONER

## 2025-02-25 PROCEDURE — 81001 URINALYSIS AUTO W/SCOPE: CPT | Performed by: NURSE PRACTITIONER

## 2025-02-25 ASSESSMENT — PAIN SCALES - GENERAL: PAINLEVEL_OUTOF10: 3

## 2025-02-25 NOTE — PROGRESS NOTES
"Patient ID: Lynette Garcia is a 64 y.o. female.  Referring Physician: No referring provider defined for this encounter.  Primary Care Provider: Annamarie Storey DO    Subjective    HPI  65yo pt with 10 x 9.5 x 7.5 cm pelvic mass presenting for post op visit.  Tumor markers including CA 19-9, CEA, and  all wnl. S/p TLH, BSO on 1/21/25 with benign path. Overall feeling well. Had some constipation, takes Miralax and Senna as needed. No longer taking anything for pain. Appetite has been good. Denies any nausea or vomiting. Denies vaginal bleeding other than a few days of spotting immediately following surgery. Intermittent burning with urination.     Past Medical History  Vertigo     Surgical History  She has a past surgical history that includes Other surgical history (04/14/2014); Other surgical history (04/14/2014); Mouth surgery (04/14/2014); Nasal septum surgery; and Nose surgery.        Social History  She reports that she has never smoked. She has never used smokeless tobacco. She reports that she does not currently use alcohol. She reports that she does not use drugs.     Allergies  Antihistamine-1, Aspirin, Cefixime, Epinephrine, Erythromycin, Lactose, Nsaids (non-steroidal anti-inflammatory drug), Penicillins, Pseudoephedrine, and Sulfamethoxazole-trimethoprim    Objective    BSA: 1.76 meters squared  Pulse 79   Temp 35.6 °C (96.1 °F) (Temporal)   Resp 18   Ht 1.591 m (5' 2.64\")   Wt 69.8 kg (153 lb 15.9 oz)   SpO2 100%   BMI 27.59 kg/m²      Physical Exam  Vitals and nursing note reviewed.   Constitutional:       Appearance: Normal appearance. She is normal weight.   HENT:      Mouth/Throat:      Mouth: Mucous membranes are moist.      Pharynx: Oropharynx is clear.   Eyes:      Conjunctiva/sclera: Conjunctivae normal.      Pupils: Pupils are equal, round, and reactive to light.   Cardiovascular:      Rate and Rhythm: Normal rate and regular rhythm.   Pulmonary:      Effort: Pulmonary effort is " normal.      Breath sounds: Normal breath sounds.   Abdominal:      General: Abdomen is flat. There is no distension.      Palpations: Abdomen is soft. There is no mass.      Tenderness: There is no abdominal tenderness.      Comments: Midline incision healing well, no erythema or drainage   Genitourinary:     Comments: deferred  Musculoskeletal:         General: Normal range of motion.   Skin:     General: Skin is warm and dry.   Neurological:      Mental Status: She is alert.   Psychiatric:         Mood and Affect: Mood normal.         Behavior: Behavior normal.         Narrative & Impression   Interpreted By:  Chucho Reagan  and Ramon Sinha   STUDY:  MR PELVIS W AND WO IV CONTRAST;  1/3/2025 2:51 pm      INDICATION:  Signs/Symptoms:evaluate pelvic mass, c/f torsion.          COMPARISON:  CT abdomen pelvis dated 01/02/2025, CT abdomen pelvis dated  12/30/2024, CT abdomen pelvis dated 06/20/2021      ACCESSION NUMBER(S):  [id number]      ORDERING CLINICIAN:  EDIL VANN      TECHNIQUE:  Multiplanar MRI of the pelvis was obtained including axial, sagittal  and coronal T2 weighted SSFSE, (para)axial, (para)coronal and  sagittal T2 FSE , axial DWI, pre and post gadolinium dynamic T1 GRE  sequences in 3 planes.  15 ML of Dotarem was administered intravenously without complication.      FINDINGS:  UTERUS:  The uterus is anteverted. There is a small 0.7 cm posterior  intramural fibroid. Assessment of the junctional zone is limited as  the uterus demonstrates T2 hypointensity, typical of postmenopausal  patients. The endometrium is only heterogeneous and increased in  thickness measuring 0.6 cm.  The vagina and vulva are collapsed and  otherwise unremarkable.      OVARIES/ADNEXA:      RIGHT:  The right ovary is normal in size.   There is  no hydrosalpinx. There  is a T1 hyperintense, nonenhancing cystic lesion measuring 0.9 x 0.8  cm, most compatible with a hemorrhagic cyst.      LEFT:  There is a large  T2 hypointense, T1 isointense lesion, predominantly  in the left adnexa, measuring 10 x 9.5 x 7.5 cm. The mass  demonstrates medial anterior superior positioning in the pelvis with  no demonstrable enhancement. There is an associated elongated  structure spanning from the uterus to the mass measuring 5.2 x 2.9 cm  (series 5, image 28) which appears to be related to the mass. The  aforementioned mass also appears to be related to the gonadal vessels  which show medial inflection as they abut the large adnexal mass.      PERITONEAL FLUID:  Small amount of pelvic fluid is present.      PELVIC LYMPH NODES:  No abnormally enlarged pelvic lymph nodes are identified.      BOWEL:  No significant abnormality.      BONES:  No focal lesions are noted in the bone.      IMPRESSION:  1.  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 Differential diagnosis  includes fibrous lesion such as fibroma or fibrothecoma. Residual  endometrioma is felt to be less likely. Associated elongated  structure may represent twisted fallopian tube or other adnexal  structure.  2. Hemorrhagic right ovarian cyst.  3. Endometrial thickening. Correlation with direct visualization may  be of value to exclude neoplastic process in case of persistent  clinical concerns.  4. Nonspecific small volume pelvic free fluid.      I personally reviewed the images/study and I agree with the findings  as stated above by resident physician, Bharath Navarro MD. This study  was interpreted at [facility],  [place], [place].      MACRO:  Bharath Navarro discussed the significance and urgency of this critical  finding by telephone with  RAMIN SAKSHI on 1/3/2025 at 3:59 pm.  (**-RCF-**) Findings:  See findings.      Signed by: Chucho Lopez 1/3/2025 5:29 PM  Dictation workstation:   [id number]        Performance Status:  Symptomatic; fully ambulatory    Assessment/Plan   63yo pt with 10 x 9.5 x 7.5 cm pelvic mass s/p TLH, BSO on 1/21/25 presenting for post op visit.  Tumor markers including CA 19-9, CEA, and  all wnl.     # Pelvic mass  - We discussed the pathology was benign  - Recovering well  - Reviewed ongoing restrictions (no lifting more than 10-15lb, nothing per vagina, no soaking)  - Follow up in 2-3 weeks for vaginal cuff check  - UA collected for burning with urination, results negative      Marizol Vargas, APRN-CNP

## 2025-02-27 LAB — BACTERIA UR CULT: NORMAL

## 2025-03-17 NOTE — PROGRESS NOTES
Patient ID: Lynette Garcia is a 64 y.o. female.  Referring Physician: No referring provider defined for this encounter.  Primary Care Provider: Annamarie Storey DO    Subjective    HPI  63yo pt with 10 x 9.5 x 7.5 cm pelvic mass presenting for post op visit.  Tumor markers including CA 19-9, CEA, and  all wnl. S/p TLH, BSO on 1/21/25 with benign path. Overall feeling well. Had some constipation, takes Miralax and Senna as needed. No longer taking anything for pain. Appetite has been good. Denies any nausea or vomiting. Denies vaginal bleeding other than a few days of spotting immediately following surgery.      Past Medical History  Vertigo     Surgical History  She has a past surgical history that includes Other surgical history (04/14/2014); Other surgical history (04/14/2014); Mouth surgery (04/14/2014); Nasal septum surgery; and Nose surgery.        Social History  She reports that she has never smoked. She has never used smokeless tobacco. She reports that she does not currently use alcohol. She reports that she does not use drugs.     Allergies  Antihistamine-1, Aspirin, Cefixime, Epinephrine, Erythromycin, Lactose, Nsaids (non-steroidal anti-inflammatory drug), Penicillins, Pseudoephedrine, and Sulfamethoxazole-trimethoprim    Objective    BSA: 1.77 meters squared  /83 (BP Location: Left arm, Patient Position: Sitting, BP Cuff Size: Adult)   Pulse 81   Temp 36 °C (96.8 °F) (Temporal)   Resp 17   Wt 70.8 kg (156 lb 3.1 oz)   SpO2 100%   BMI 27.99 kg/m²      Physical Exam  Vitals and nursing note reviewed.   Constitutional:       Appearance: Normal appearance. She is normal weight.   HENT:      Mouth/Throat:      Mouth: Mucous membranes are moist.      Pharynx: Oropharynx is clear.   Eyes:      Conjunctiva/sclera: Conjunctivae normal.      Pupils: Pupils are equal, round, and reactive to light.   Cardiovascular:      Rate and Rhythm: Normal rate and regular rhythm.   Pulmonary:       Effort: Pulmonary effort is normal.      Breath sounds: Normal breath sounds.   Abdominal:      General: Abdomen is flat. There is no distension.      Palpations: Abdomen is soft. There is no mass.      Tenderness: There is no abdominal tenderness.      Comments: Midline incision healing well, no erythema or drainage   Genitourinary:     Comments: Vaginal cuff well healed. Scant spotting from the cuff with manipulation of speculum.  Musculoskeletal:         General: Normal range of motion.   Skin:     General: Skin is warm and dry.   Neurological:      Mental Status: She is alert.   Psychiatric:         Mood and Affect: Mood normal.         Behavior: Behavior normal.         Narrative & Impression   Interpreted By:  Chucho Reagan and Benza Andrew   STUDY:  MR PELVIS W AND WO IV CONTRAST;  1/3/2025 2:51 pm      INDICATION:  Signs/Symptoms:evaluate pelvic mass, c/f torsion.          COMPARISON:  CT abdomen pelvis dated 01/02/2025, CT abdomen pelvis dated  12/30/2024, CT abdomen pelvis dated 06/20/2021      ACCESSION NUMBER(S):  [id number]      ORDERING CLINICIAN:  EDIL VANN      TECHNIQUE:  Multiplanar MRI of the pelvis was obtained including axial, sagittal  and coronal T2 weighted SSFSE, (para)axial, (para)coronal and  sagittal T2 FSE , axial DWI, pre and post gadolinium dynamic T1 GRE  sequences in 3 planes.  15 ML of Dotarem was administered intravenously without complication.      FINDINGS:  UTERUS:  The uterus is anteverted. There is a small 0.7 cm posterior  intramural fibroid. Assessment of the junctional zone is limited as  the uterus demonstrates T2 hypointensity, typical of postmenopausal  patients. The endometrium is only heterogeneous and increased in  thickness measuring 0.6 cm.  The vagina and vulva are collapsed and  otherwise unremarkable.      OVARIES/ADNEXA:      RIGHT:  The right ovary is normal in size.   There is  no hydrosalpinx. There  is a T1 hyperintense, nonenhancing  cystic lesion measuring 0.9 x 0.8  cm, most compatible with a hemorrhagic cyst.      LEFT:  There is a large T2 hypointense, T1 isointense lesion, predominantly  in the left adnexa, measuring 10 x 9.5 x 7.5 cm. The mass  demonstrates medial anterior superior positioning in the pelvis with  no demonstrable enhancement. There is an associated elongated  structure spanning from the uterus to the mass measuring 5.2 x 2.9 cm  (series 5, image 28) which appears to be related to the mass. The  aforementioned mass also appears to be related to the gonadal vessels  which show medial inflection as they abut the large adnexal mass.      PERITONEAL FLUID:  Small amount of pelvic fluid is present.      PELVIC LYMPH NODES:  No abnormally enlarged pelvic lymph nodes are identified.      BOWEL:  No significant abnormality.      BONES:  No focal lesions are noted in the bone.      IMPRESSION:  1.  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 Differential diagnosis  includes fibrous lesion such as fibroma or fibrothecoma. Residual  endometrioma is felt to be less likely. Associated elongated  structure may represent twisted fallopian tube or other adnexal  structure.  2. Hemorrhagic right ovarian cyst.  3. Endometrial thickening. Correlation with direct visualization may  be of value to exclude neoplastic process in case of persistent  clinical concerns.  4. Nonspecific small volume pelvic free fluid.      I personally reviewed the images/study and I agree with the findings  as stated above by resident physician, Bharath Navarro MD. This study  was interpreted at [facility],  [place], [place].      MACRO:  Bharath Navarro discussed the significance and urgency of this critical  finding by telephone with  RAMIN CANTOR on 1/3/2025 at 3:59 pm.  (**-RCF-**) Findings:  See  findings.      Signed by: Chucho Lopez 1/3/2025 5:29 PM  Dictation workstation:   [id number]       Performance Status:  Symptomatic; fully ambulatory    Assessment/Plan   63yo pt with 10 x 9.5 x 7.5 cm pelvic mass s/p TLH, BSO on 1/21/25 presenting for post op visit.  Tumor markers including CA 19-9, CEA, and  all wnl.     # Pelvic mass  - We discussed the pathology was benign  - Recovering well  - May resume regular activity as tolerated  - Follow up with PCP for annual well woman visits      Marizol Vargas, RAMÍREZ-CNP

## 2025-03-18 ENCOUNTER — OFFICE VISIT (OUTPATIENT)
Dept: GYNECOLOGIC ONCOLOGY | Facility: CLINIC | Age: 65
End: 2025-03-18
Payer: MEDICARE

## 2025-03-18 VITALS
RESPIRATION RATE: 17 BRPM | DIASTOLIC BLOOD PRESSURE: 83 MMHG | BODY MASS INDEX: 27.99 KG/M2 | SYSTOLIC BLOOD PRESSURE: 146 MMHG | WEIGHT: 156.2 LBS | HEART RATE: 81 BPM | OXYGEN SATURATION: 100 % | TEMPERATURE: 96.8 F

## 2025-03-18 DIAGNOSIS — R19.00 PELVIC MASS: Primary | ICD-10-CM

## 2025-03-18 PROCEDURE — 1125F AMNT PAIN NOTED PAIN PRSNT: CPT | Performed by: NURSE PRACTITIONER

## 2025-03-18 PROCEDURE — 99211 OFF/OP EST MAY X REQ PHY/QHP: CPT | Performed by: NURSE PRACTITIONER

## 2025-03-18 PROCEDURE — 1159F MED LIST DOCD IN RCRD: CPT | Performed by: NURSE PRACTITIONER

## 2025-03-18 ASSESSMENT — PAIN SCALES - GENERAL: PAINLEVEL_OUTOF10: 3

## 2025-04-29 DIAGNOSIS — Z12.31 ENCOUNTER FOR SCREENING MAMMOGRAM FOR BREAST CANCER: ICD-10-CM

## (undated) DEVICE — REST, HEAD, BAGEL, 9 IN

## (undated) DEVICE — STATLOCK, FOLEY SWIVEL, SILICONE TRICOT

## (undated) DEVICE — TOWEL, OR, XRAY DETECT 5 PK, WHITE, 17X26, W/DMT TAG, ST

## (undated) DEVICE — SPONGE, DISSECTOR, PEANUT, 3/8, STERILE 5 FOAM HOLDER"

## (undated) DEVICE — DRAPE, SHEET, UTILITY, NON ABSORBENT, 18 X 26 IN, LF

## (undated) DEVICE — CLIPPER, SURGICAL BLADE ASSEMBLY, GENERAL PURPOSE, SINGLE USE

## (undated) DEVICE — HOLSTER, JET SAFETY

## (undated) DEVICE — MANIFOLD, 4 PORT NEPTUNE STANDARD

## (undated) DEVICE — CLIP, LIGATING, HORIZON, LARGE, TITANIUM

## (undated) DEVICE — PREP TRAY, SKIN, DRY, W/GLOVES

## (undated) DEVICE — DRAPE, MAGENTIC INSTRUMENT, 12X16

## (undated) DEVICE — Device

## (undated) DEVICE — DRAPE, FLUID WARMER

## (undated) DEVICE — SUTURE, VICRYL 0, TAPER POINT, CT-1 VIOLET 27 INCH

## (undated) DEVICE — SUTURE, PDSII, 1, TP-1, VIL, MONO, 48LP

## (undated) DEVICE — SUTURE, VICRYL, 2-0, 27 IN, BR/SH 27, VIOLET

## (undated) DEVICE — STRIP, SKIN CLOSURE, STERI STRIP, REINFORCED, 0.5 X 4 IN

## (undated) DEVICE — ELECTRODE, ELECTROSURGICAL, BLADE, EXTENDED, 6.5 IN, STAINLESS STEEL

## (undated) DEVICE — LIGASURE IMPACT, 18CM

## (undated) DEVICE — CLIP, LIGATING, HORIZON, MEDIUM, TITANIUM

## (undated) DEVICE — TOWEL, SURGICAL, NEURO, O/R, 16 X 26, BLUE, STERILE

## (undated) DEVICE — COVER, TABLE, 44 X 75 IN, DISPOSABLE, LF, STERILE

## (undated) DEVICE — SUTURE, MONOCRYL, 3-0, 27 IN, SH, UNDYED

## (undated) DEVICE — COVER, CART, 45 X 27 X 48 IN, CLEAR

## (undated) DEVICE — SUTURE, VICRYL, 3-0, 27 IN, SH

## (undated) DEVICE — SUTURE, PDS II, 3-0, 27 IN, SH, VIL, MONO, LF